# Patient Record
Sex: FEMALE | Race: WHITE | NOT HISPANIC OR LATINO | Employment: FULL TIME | URBAN - METROPOLITAN AREA
[De-identification: names, ages, dates, MRNs, and addresses within clinical notes are randomized per-mention and may not be internally consistent; named-entity substitution may affect disease eponyms.]

---

## 2017-01-17 DIAGNOSIS — F43.23 ADJUSTMENT DISORDER WITH MIXED ANXIETY AND DEPRESSED MOOD: ICD-10-CM

## 2017-01-19 RX ORDER — SERTRALINE HYDROCHLORIDE 50 MG/1
TABLET, FILM COATED ORAL
Qty: 30 TABLET | OUTPATIENT
Start: 2017-01-19

## 2017-01-19 RX ORDER — SERTRALINE HYDROCHLORIDE 50 MG/1
50 TABLET, FILM COATED ORAL DAILY
Qty: 30 TABLET | Refills: 1 | Status: SHIPPED | OUTPATIENT
Start: 2017-01-19 | End: 2017-02-27 | Stop reason: SDUPTHER

## 2017-01-23 ENCOUNTER — TELEPHONE (OUTPATIENT)
Dept: RHEUMATOLOGY | Facility: CLINIC | Age: 52
End: 2017-01-23

## 2017-01-23 RX ORDER — METHYLPREDNISOLONE 4 MG/1
TABLET ORAL
Qty: 21 TABLET | Refills: 0 | Status: SHIPPED | OUTPATIENT
Start: 2017-01-23 | End: 2017-05-29 | Stop reason: ALTCHOICE

## 2017-01-23 RX ORDER — SULINDAC 200 MG/1
200 TABLET ORAL 2 TIMES DAILY
Qty: 60 TABLET | Refills: 4 | Status: SHIPPED | OUTPATIENT
Start: 2017-01-23 | End: 2017-05-24

## 2017-01-23 NOTE — TELEPHONE ENCOUNTER
Has had no response to the increased dose of etodolac.  She felt especially bad over the weekend after overdoing it.  I will give her a Medrol Dosepak.  I'm changing her anti-inflammatory medicine to sulindac 200 mg twice daily.

## 2017-01-23 NOTE — TELEPHONE ENCOUNTER
----- Message from Emi Hill MA sent at 1/23/2017  3:40 PM CST -----  Contact: Self  Pt says  she's feeling a lot of pain and would like to speak with you regarding this  ----- Message -----     From: Jacquie Espana     Sent: 1/23/2017   2:49 PM       To: Jasiel Scott    Good afternoon,     Pt is requesting an appt due to changing medications. (first available 03/09/17). Pt stated she needed to be seen sooner.    Pt can be reached at [phone].    Thank you!

## 2017-02-16 ENCOUNTER — LAB VISIT (OUTPATIENT)
Dept: LAB | Facility: HOSPITAL | Age: 52
End: 2017-02-16
Attending: INTERNAL MEDICINE
Payer: COMMERCIAL

## 2017-02-16 ENCOUNTER — OFFICE VISIT (OUTPATIENT)
Dept: RHEUMATOLOGY | Facility: CLINIC | Age: 52
End: 2017-02-16
Payer: COMMERCIAL

## 2017-02-16 DIAGNOSIS — M79.10 MYALGIA: ICD-10-CM

## 2017-02-16 DIAGNOSIS — M79.10 MYALGIA: Primary | ICD-10-CM

## 2017-02-16 LAB
CK SERPL-CCNC: 68 U/L
CRP SERPL-MCNC: 0.8 MG/L
ERYTHROCYTE [SEDIMENTATION RATE] IN BLOOD BY WESTERGREN METHOD: 5 MM/HR
T4 FREE SERPL-MCNC: 0.78 NG/DL
TSH SERPL DL<=0.005 MIU/L-ACNC: 8.94 UIU/ML

## 2017-02-16 PROCEDURE — 84439 ASSAY OF FREE THYROXINE: CPT

## 2017-02-16 PROCEDURE — 99999 PR PBB SHADOW E&M-EST. PATIENT-LVL II: CPT | Mod: PBBFAC,,, | Performed by: INTERNAL MEDICINE

## 2017-02-16 PROCEDURE — 84443 ASSAY THYROID STIM HORMONE: CPT

## 2017-02-16 PROCEDURE — 36415 COLL VENOUS BLD VENIPUNCTURE: CPT

## 2017-02-16 PROCEDURE — 86140 C-REACTIVE PROTEIN: CPT

## 2017-02-16 PROCEDURE — 82550 ASSAY OF CK (CPK): CPT

## 2017-02-16 PROCEDURE — 99213 OFFICE O/P EST LOW 20 MIN: CPT | Mod: S$GLB,,, | Performed by: INTERNAL MEDICINE

## 2017-02-16 PROCEDURE — 85651 RBC SED RATE NONAUTOMATED: CPT

## 2017-02-16 PROCEDURE — 82085 ASSAY OF ALDOLASE: CPT

## 2017-02-16 RX ORDER — GABAPENTIN 100 MG/1
100 CAPSULE ORAL 3 TIMES DAILY
Qty: 90 CAPSULE | Refills: 11 | Status: SHIPPED | OUTPATIENT
Start: 2017-02-16 | End: 2017-12-04 | Stop reason: SDUPTHER

## 2017-02-17 ENCOUNTER — PATIENT MESSAGE (OUTPATIENT)
Dept: FAMILY MEDICINE | Facility: CLINIC | Age: 52
End: 2017-02-17

## 2017-02-17 DIAGNOSIS — E06.3 HYPOTHYROIDISM DUE TO HASHIMOTO'S THYROIDITIS: Primary | ICD-10-CM

## 2017-02-17 DIAGNOSIS — E03.8 HYPOTHYROIDISM DUE TO HASHIMOTO'S THYROIDITIS: Primary | ICD-10-CM

## 2017-02-17 LAB — ALDOLASE SERPL-CCNC: 5.1 U/L

## 2017-02-20 NOTE — PROGRESS NOTES
History of present illness: 51-year-old female with chronic pain due to fibromyalgia and osteoarthritis.  She did well initially after the last visit.  She called me 3 weeks ago because of increased aching.  I treated her with a Medrol Dosepak.  She had some response but the pain recurred upon stopping.  I changed her anti-inflammatory to sulindac but this did not help.  The pain is bad in the morning.  It does not increase activity.  It does interfere with her activity.  It does not wake her up at night.  She has had no swelling.  She describes it as a diffuse aching.  She has had no burning.  She has had no fever.  She has had no other recent medical problems.    Physical examination:  Musculoskeletal: She has full range of motion of all joints.  She has no soft tissue swelling, erythema, or increased warmth.  She is tender across the shoulder girdle.    Assessment: Fibromyalgia    Plans:  1.  Laboratory studies are obtained  2.  I added gabapentin 100 mg 3 times daily.  3.  Discontinue sulindac and I'm not placing her on a different anti-inflammatory agent  4.  Return to see me in 4 months

## 2017-02-21 RX ORDER — LEVOTHYROXINE SODIUM 112 UG/1
112 TABLET ORAL
Qty: 90 TABLET | Refills: 0 | Status: SHIPPED | OUTPATIENT
Start: 2017-02-21 | End: 2017-05-06 | Stop reason: SDUPTHER

## 2017-02-27 DIAGNOSIS — F43.23 ADJUSTMENT DISORDER WITH MIXED ANXIETY AND DEPRESSED MOOD: ICD-10-CM

## 2017-03-02 RX ORDER — SERTRALINE HYDROCHLORIDE 50 MG/1
TABLET, FILM COATED ORAL
Qty: 30 TABLET | Refills: 1 | Status: SHIPPED | OUTPATIENT
Start: 2017-03-02 | End: 2017-05-06 | Stop reason: SDUPTHER

## 2017-03-15 DIAGNOSIS — I10 ESSENTIAL HYPERTENSION: ICD-10-CM

## 2017-03-15 RX ORDER — BENAZEPRIL HYDROCHLORIDE AND HYDROCHLOROTHIAZIDE 10; 12.5 MG/1; MG/1
TABLET ORAL
Qty: 90 TABLET | Refills: 0 | Status: SHIPPED | OUTPATIENT
Start: 2017-03-15 | End: 2017-05-29 | Stop reason: SDUPTHER

## 2017-03-21 ENCOUNTER — PATIENT MESSAGE (OUTPATIENT)
Dept: FAMILY MEDICINE | Facility: CLINIC | Age: 52
End: 2017-03-21

## 2017-04-20 ENCOUNTER — TELEPHONE (OUTPATIENT)
Dept: FAMILY MEDICINE | Facility: CLINIC | Age: 52
End: 2017-04-20

## 2017-04-20 DIAGNOSIS — Z00.00 ROUTINE GENERAL MEDICAL EXAMINATION AT A HEALTH CARE FACILITY: Primary | ICD-10-CM

## 2017-05-06 DIAGNOSIS — E06.3 HYPOTHYROIDISM DUE TO HASHIMOTO'S THYROIDITIS: ICD-10-CM

## 2017-05-06 DIAGNOSIS — E03.8 HYPOTHYROIDISM DUE TO HASHIMOTO'S THYROIDITIS: ICD-10-CM

## 2017-05-06 DIAGNOSIS — F43.23 ADJUSTMENT DISORDER WITH MIXED ANXIETY AND DEPRESSED MOOD: ICD-10-CM

## 2017-05-07 RX ORDER — SERTRALINE HYDROCHLORIDE 50 MG/1
TABLET, FILM COATED ORAL
Qty: 30 TABLET | Refills: 0 | Status: SHIPPED | OUTPATIENT
Start: 2017-05-07 | End: 2017-05-29 | Stop reason: SDUPTHER

## 2017-05-07 RX ORDER — LEVOTHYROXINE SODIUM 112 UG/1
TABLET ORAL
Qty: 90 TABLET | Refills: 0 | Status: SHIPPED | OUTPATIENT
Start: 2017-05-07 | End: 2017-05-29 | Stop reason: SDUPTHER

## 2017-05-20 ENCOUNTER — LAB VISIT (OUTPATIENT)
Dept: LAB | Facility: HOSPITAL | Age: 52
End: 2017-05-20
Attending: INTERNAL MEDICINE
Payer: COMMERCIAL

## 2017-05-20 DIAGNOSIS — E06.3 HYPOTHYROIDISM DUE TO HASHIMOTO'S THYROIDITIS: ICD-10-CM

## 2017-05-20 DIAGNOSIS — E03.8 HYPOTHYROIDISM DUE TO HASHIMOTO'S THYROIDITIS: ICD-10-CM

## 2017-05-20 DIAGNOSIS — Z00.00 ROUTINE GENERAL MEDICAL EXAMINATION AT A HEALTH CARE FACILITY: ICD-10-CM

## 2017-05-20 LAB
ALBUMIN SERPL BCP-MCNC: 3.8 G/DL
ALP SERPL-CCNC: 92 U/L
ALT SERPL W/O P-5'-P-CCNC: 32 U/L
ANION GAP SERPL CALC-SCNC: 9 MMOL/L
AST SERPL-CCNC: 34 U/L
BASOPHILS # BLD AUTO: 0.02 K/UL
BASOPHILS NFR BLD: 0.4 %
BILIRUB SERPL-MCNC: 0.4 MG/DL
BUN SERPL-MCNC: 13 MG/DL
CALCIUM SERPL-MCNC: 9.3 MG/DL
CHLORIDE SERPL-SCNC: 103 MMOL/L
CHOLEST/HDLC SERPL: 4.9 {RATIO}
CO2 SERPL-SCNC: 27 MMOL/L
CREAT SERPL-MCNC: 0.8 MG/DL
DIFFERENTIAL METHOD: ABNORMAL
EOSINOPHIL # BLD AUTO: 0.2 K/UL
EOSINOPHIL NFR BLD: 3.4 %
ERYTHROCYTE [DISTWIDTH] IN BLOOD BY AUTOMATED COUNT: 13 %
EST. GFR  (AFRICAN AMERICAN): >60 ML/MIN/1.73 M^2
EST. GFR  (NON AFRICAN AMERICAN): >60 ML/MIN/1.73 M^2
GLUCOSE SERPL-MCNC: 97 MG/DL
HCT VFR BLD AUTO: 42.2 %
HDL/CHOLESTEROL RATIO: 20.5 %
HDLC SERPL-MCNC: 259 MG/DL
HDLC SERPL-MCNC: 53 MG/DL
HGB BLD-MCNC: 13.5 G/DL
LDLC SERPL CALC-MCNC: 183.6 MG/DL
LYMPHOCYTES # BLD AUTO: 1.3 K/UL
LYMPHOCYTES NFR BLD: 25 %
MCH RBC QN AUTO: 29 PG
MCHC RBC AUTO-ENTMCNC: 32 %
MCV RBC AUTO: 91 FL
MONOCYTES # BLD AUTO: 0.7 K/UL
MONOCYTES NFR BLD: 12.9 %
NEUTROPHILS # BLD AUTO: 2.9 K/UL
NEUTROPHILS NFR BLD: 58.1 %
NONHDLC SERPL-MCNC: 206 MG/DL
PLATELET # BLD AUTO: 140 K/UL
PMV BLD AUTO: 11 FL
POTASSIUM SERPL-SCNC: 3.8 MMOL/L
PROT SERPL-MCNC: 7.1 G/DL
RBC # BLD AUTO: 4.66 M/UL
SODIUM SERPL-SCNC: 139 MMOL/L
TRIGL SERPL-MCNC: 112 MG/DL
TSH SERPL DL<=0.005 MIU/L-ACNC: 0.51 UIU/ML
WBC # BLD AUTO: 5.03 K/UL

## 2017-05-20 PROCEDURE — 84443 ASSAY THYROID STIM HORMONE: CPT

## 2017-05-20 PROCEDURE — 80061 LIPID PANEL: CPT

## 2017-05-20 PROCEDURE — 80053 COMPREHEN METABOLIC PANEL: CPT

## 2017-05-20 PROCEDURE — 85025 COMPLETE CBC W/AUTO DIFF WBC: CPT

## 2017-05-20 PROCEDURE — 36415 COLL VENOUS BLD VENIPUNCTURE: CPT | Mod: PO

## 2017-05-24 ENCOUNTER — OFFICE VISIT (OUTPATIENT)
Dept: RHEUMATOLOGY | Facility: CLINIC | Age: 52
End: 2017-05-24
Payer: COMMERCIAL

## 2017-05-24 VITALS
WEIGHT: 195 LBS | HEIGHT: 64 IN | HEART RATE: 71 BPM | BODY MASS INDEX: 33.29 KG/M2 | SYSTOLIC BLOOD PRESSURE: 134 MMHG | DIASTOLIC BLOOD PRESSURE: 81 MMHG

## 2017-05-24 DIAGNOSIS — M76.51 PATELLAR TENDINITIS OF RIGHT KNEE: ICD-10-CM

## 2017-05-24 DIAGNOSIS — M79.7 FIBROMYALGIA: Primary | ICD-10-CM

## 2017-05-24 PROCEDURE — 99999 PR PBB SHADOW E&M-EST. PATIENT-LVL III: CPT | Mod: PBBFAC,,, | Performed by: INTERNAL MEDICINE

## 2017-05-24 PROCEDURE — 3079F DIAST BP 80-89 MM HG: CPT | Mod: S$GLB,,, | Performed by: INTERNAL MEDICINE

## 2017-05-24 PROCEDURE — 99213 OFFICE O/P EST LOW 20 MIN: CPT | Mod: S$GLB,,, | Performed by: INTERNAL MEDICINE

## 2017-05-24 PROCEDURE — 1160F RVW MEDS BY RX/DR IN RCRD: CPT | Mod: S$GLB,,, | Performed by: INTERNAL MEDICINE

## 2017-05-24 PROCEDURE — 3075F SYST BP GE 130 - 139MM HG: CPT | Mod: S$GLB,,, | Performed by: INTERNAL MEDICINE

## 2017-05-24 NOTE — PROGRESS NOTES
History of present illness: 52-year-old female with chronic pain due to osteoarthritis and fibromyalgia.  She was last seen in February.  Her pain had increased.  I took her off anti-inflammatory medicines because they were not helping.  I placed her on gabapentin 100 mg 3 times daily.  She feels this is helping.  Her thyroid level was low.  Her thyroid dosage was increased.  She is feeling better since then.  Her worse problems are her knees.  They're worse with prolonged activity.  She has also been kneeling paining a dresser in this made the pain worse.  She denies any swelling of the knee.  She used to topical medication which helped.  She has not been using heat or ice.  She is taking no other medication for the pain.    Physical examination:  Musculoskeletal: Shoulders, elbows, wrists, small joints of the hands are unremarkable.  She has bony hypertrophy of both knees.  She is tender especially over the infrapatellar tendon of the right knee.  She has no effusion.  She has pain on range of motion of the knee.    Assessment: Her main pain generator seems to be a infrapatellar tendinitis    Plans:  1.  Continue gabapentin as before  2.  Use heat to the knee  3.  Continue the topical medication  4.  She may use Tylenol as needed for pain  5.  Return to see me in 6 months

## 2017-05-29 ENCOUNTER — OFFICE VISIT (OUTPATIENT)
Dept: FAMILY MEDICINE | Facility: CLINIC | Age: 52
End: 2017-05-29
Payer: COMMERCIAL

## 2017-05-29 VITALS
TEMPERATURE: 98 F | HEIGHT: 64 IN | SYSTOLIC BLOOD PRESSURE: 130 MMHG | DIASTOLIC BLOOD PRESSURE: 88 MMHG | BODY MASS INDEX: 32.6 KG/M2 | WEIGHT: 190.94 LBS | HEART RATE: 76 BPM

## 2017-05-29 DIAGNOSIS — E66.9 OBESITY (BMI 30-39.9): ICD-10-CM

## 2017-05-29 DIAGNOSIS — G47.9 DIFFICULTY SLEEPING: ICD-10-CM

## 2017-05-29 DIAGNOSIS — M79.7 FIBROMYALGIA: ICD-10-CM

## 2017-05-29 DIAGNOSIS — E03.8 HYPOTHYROIDISM DUE TO HASHIMOTO'S THYROIDITIS: ICD-10-CM

## 2017-05-29 DIAGNOSIS — E78.5 HYPERLIPIDEMIA, UNSPECIFIED HYPERLIPIDEMIA TYPE: ICD-10-CM

## 2017-05-29 DIAGNOSIS — Z86.39 HISTORY OF LOW POTASSIUM: ICD-10-CM

## 2017-05-29 DIAGNOSIS — I10 ESSENTIAL HYPERTENSION: ICD-10-CM

## 2017-05-29 DIAGNOSIS — F43.23 ADJUSTMENT DISORDER WITH MIXED ANXIETY AND DEPRESSED MOOD: ICD-10-CM

## 2017-05-29 DIAGNOSIS — Z23 NEED FOR TETANUS BOOSTER: ICD-10-CM

## 2017-05-29 DIAGNOSIS — E06.3 HYPOTHYROIDISM DUE TO HASHIMOTO'S THYROIDITIS: ICD-10-CM

## 2017-05-29 DIAGNOSIS — Z86.39 HISTORY OF VITAMIN D DEFICIENCY: ICD-10-CM

## 2017-05-29 DIAGNOSIS — Z00.00 ANNUAL PHYSICAL EXAM: Primary | ICD-10-CM

## 2017-05-29 PROCEDURE — 99999 PR PBB SHADOW E&M-EST. PATIENT-LVL III: CPT | Mod: PBBFAC,,, | Performed by: INTERNAL MEDICINE

## 2017-05-29 PROCEDURE — 90471 IMMUNIZATION ADMIN: CPT | Mod: S$GLB,,, | Performed by: INTERNAL MEDICINE

## 2017-05-29 PROCEDURE — 99396 PREV VISIT EST AGE 40-64: CPT | Mod: 25,S$GLB,, | Performed by: INTERNAL MEDICINE

## 2017-05-29 PROCEDURE — 90714 TD VACC NO PRESV 7 YRS+ IM: CPT | Mod: S$GLB,,, | Performed by: INTERNAL MEDICINE

## 2017-05-29 RX ORDER — BENAZEPRIL HYDROCHLORIDE AND HYDROCHLOROTHIAZIDE 10; 12.5 MG/1; MG/1
1 TABLET ORAL DAILY
Qty: 90 TABLET | Refills: 3 | Status: SHIPPED | OUTPATIENT
Start: 2017-05-29 | End: 2018-06-11 | Stop reason: SDUPTHER

## 2017-05-29 RX ORDER — SERTRALINE HYDROCHLORIDE 50 MG/1
50 TABLET, FILM COATED ORAL DAILY
Qty: 90 TABLET | Refills: 3 | Status: SHIPPED | OUTPATIENT
Start: 2017-05-29 | End: 2018-06-11 | Stop reason: SDUPTHER

## 2017-05-29 RX ORDER — LEVOTHYROXINE SODIUM 112 UG/1
112 TABLET ORAL
Qty: 90 TABLET | Refills: 3 | Status: SHIPPED | OUTPATIENT
Start: 2017-05-29 | End: 2018-05-08 | Stop reason: SDUPTHER

## 2017-05-29 RX ORDER — ERGOCALCIFEROL 1.25 MG/1
50000 CAPSULE ORAL
Qty: 12 CAPSULE | Refills: 3 | Status: SHIPPED | OUTPATIENT
Start: 2017-05-29 | End: 2018-05-08 | Stop reason: SDUPTHER

## 2017-05-29 NOTE — PROGRESS NOTES
Two patient identifiers verified.  Allergies reviewed.   TD IM administered to left deltoid per MD order.  Patient tolerated injection well; no redness, bleeding, or bruising noted to injection site.  Patient instructed to remain in clinic setting for 15 minutes.  Verbalizes understanding.

## 2017-05-29 NOTE — PROGRESS NOTES
Subjective:        Patient ID: Winifred Love is a 52 y.o. female.    Chief Complaint: Annual Exam    HPI   Winifred Love presents for annual exam.    Review of Systems   Constitutional: Positive for activity change (more regular physical activity). Negative for unexpected weight change.   HENT: Negative for hearing loss.    Eyes: Negative for visual disturbance (glasses UTD).   Respiratory: Negative for chest tightness and shortness of breath.    Cardiovascular: Negative for chest pain and leg swelling.   Gastrointestinal: Negative for abdominal pain, blood in stool, constipation and diarrhea.   Genitourinary: Negative for difficulty urinating, hematuria, vaginal bleeding and vaginal discharge.   Skin: Negative for rash.   Neurological: Negative for dizziness and light-headedness.   Psychiatric/Behavioral: Positive for sleep disturbance (chronic).        - depressed mood, anxiety better           Objective:        Vitals:    05/29/17 0905   BP: 130/88   Pulse: 76   Temp: 98.4 °F (36.9 °C)     Physical Exam        Assessment:         1. Annual physical exam    2. Need for tetanus booster    3. Essential hypertension    4. Hypothyroidism due to Hashimoto's thyroiditis    5. History of low potassium    6. Fibromyalgia    7. Obesity (BMI 30-39.9)    8. History of vitamin D deficiency    9. Adjustment disorder with mixed anxiety and depressed mood    10. Difficulty sleeping    11. Hyperlipidemia, unspecified hyperlipidemia type              Plan:         Winifred was seen today for annual exam.    Diagnoses and all orders for this visit:    Annual physical exam  - Reviewed guidelines for breast, colorectal and cervical CA screenings.  Pt declines all screenings.  Concerned about unwanted treatments if something was found, never wants to have chemotherapy.  - Td today    Need for tetanus booster  -     Td Vaccine (Adult) - Preservative Free    Essential hypertension: Controlled; continue Benazepril/HCTZ  10/12.5mg qd.  -     benazepril-hydrochlorthiazide (LOTENSIN HCT) 10-12.5 mg Tab; Take 1 tablet by mouth once daily.    Hypothyroidism due to Hashimoto's thyroiditis: Well controlled w Levothyroxine 112 qd  -     levothyroxine (SYNTHROID) 112 MCG tablet; Take 1 tablet (112 mcg total) by mouth before breakfast.    History of low potassium: K WNL; stop daily K supplement.  Pt had self discontinued K supplement 2 months ago.    Fibromyalgia: No acute issues; better w Gabapentin.  Encouraged pt to continue with daily physical activity as tolerated.  Follow up rheum.    Obesity (BMI 30-39.9): Stable; discussed diet in regard to HLD and exercise.    History of vitamin D deficiency: No acute issues; Vit D qweek.  -     ergocalciferol (ERGOCALCIFEROL) 50,000 unit Cap; Take 1 capsule (50,000 Units total) by mouth every 7 days.    Adjustment disorder with mixed anxiety and depressed mood: Stable on Sertraline 50mg qd.  -     sertraline (ZOLOFT) 50 MG tablet; Take 1 tablet (50 mg total) by mouth once daily.    Difficulty sleeping: Unisom PRN    Hyperlipidemia, unspecified hyperlipidemia type: ASCVD risk 3.5%; no statin indicated.  Discussed diet.          Return in 1  year for annual exam or sooner PRN.    Patient Instructions     Low-Cholesterol Diet  Your body needs cholesterol to build new cells and create certain hormones. There are 2 kinds of cholesterol in your blood:    · HDL (good) cholesterol. This prevents fat deposits (plaque) from building up in your arteries. In this way it protects against heart disease and stroke.  · LDL (bad) cholesterol. This stays in your body and sticks to artery walls. Over time it may block blood flow to the heart and brain. This can cause a heart attack or stroke.  The cholesterol in your blood comes from 2 sources: cholesterol in food that you eat and cholesterol that your liver makes. You should limit the amount of cholesterol in your diet. But the cholesterol that your body makes  has the greatest disease risk. And your body makes more cholesterol when your diet is high in bad fats (saturated and trans fats). There are 2 kinds of fats you can eat:  · Good fats, or unsaturated fats (mono-unsaturated and poly-unsaturated). They raise the level of good cholesterol and lower the level of bad cholesterol. Good fats are found in vegetable oils such as olive, sunflower, corn, and soybean oils, and in nuts and seeds.  · Bad fats, or saturated fats (including foods high in cholesterol) and trans fats. These raise your risk of disease. They lower the good cholesterol and raise the level of bad cholesterol. Bad fats are found in animal products, including meat, whole-milk dairy products, and butter. Some plants are also high in bad fats (coconut and palm plants). Trans fats are found in hard (stick) margarines. They are also in many fast foods and commercially baked goods. Soft margarine sold in tubs has fewer trans fats and is safer to use.  High blood cholesterol is usually due to a diet high in saturated fat, along with not being physically active. In some cases, genetics plays a role in causing high cholesterol. The tips below will help you create healthy eating habits that will help lower your blood cholesterol level.  Create a diet high in good fats, low in bad fats (and low in cholesterol)  The following steps will help you create a diet high in good fats and low in bad fats:  · Talk with your doctor before starting a low cholesterol diet or weight loss program.  · Learn to read nutrition labels and select appropriate portion sizes.  · When cooking, use plant-based unsaturated vegetable oils (sunflower, corn, soybean, canola, peanut, and olive oils).  · Avoid saturated fats found in animal products such as meat, dairy (whole-milk, cheese and ice cream), poultry skin, and egg yolks. Plants high in saturated oils include coconut oil, palm oil, and palm kernel oil.  · If you eat meat, choose smaller  portions and lean cuts, such as round, mercy, sirloin, or loin. Eat more meatless meals.  · Replace meat with fish at least 2 times a week. Fish is an important source of the unsaturated fat called omega-3 fatty acids. This fat has potential to lower the risk of heart disease.  · Replace whole-milk dairy products with low-fat or nonfat products. Try soy products. Soy helps to reduce total cholesterol.  · Supplement your diet with protective fibers. Eat nuts, seeds, and whole grains rather than white rice and bread. These foods lower both cholesterol and triglyceride levels. (Triglycerides are another fat found in the blood.) Walnuts are one of the best sources of omega-3 fatty acids.  · Eat plenty of fresh fruits and vegetables daily.  · Avoid fast foods and commercial baked goods. Assume they contain saturated fat unless labeled otherwise.  Date Last Reviewed: 8/1/2016  © 9452-0018 [employer] [address] [place] [address], [place] [address]. All rights reserved. This information is not intended as a substitute for professional medical care. Always follow your healthcare professional's instructions.

## 2017-05-29 NOTE — PATIENT INSTRUCTIONS
Low-Cholesterol Diet  Your body needs cholesterol to build new cells and create certain hormones. There are 2 kinds of cholesterol in your blood:    · HDL (good) cholesterol. This prevents fat deposits (plaque) from building up in your arteries. In this way it protects against heart disease and stroke.  · LDL (bad) cholesterol. This stays in your body and sticks to artery walls. Over time it may block blood flow to the heart and brain. This can cause a heart attack or stroke.  The cholesterol in your blood comes from 2 sources: cholesterol in food that you eat and cholesterol that your liver makes. You should limit the amount of cholesterol in your diet. But the cholesterol that your body makes has the greatest disease risk. And your body makes more cholesterol when your diet is high in bad fats (saturated and trans fats). There are 2 kinds of fats you can eat:  · Good fats, or unsaturated fats (mono-unsaturated and poly-unsaturated). They raise the level of good cholesterol and lower the level of bad cholesterol. Good fats are found in vegetable oils such as olive, sunflower, corn, and soybean oils, and in nuts and seeds.  · Bad fats, or saturated fats (including foods high in cholesterol) and trans fats. These raise your risk of disease. They lower the good cholesterol and raise the level of bad cholesterol. Bad fats are found in animal products, including meat, whole-milk dairy products, and butter. Some plants are also high in bad fats (coconut and palm plants). Trans fats are found in hard (stick) margarines. They are also in many fast foods and commercially baked goods. Soft margarine sold in tubs has fewer trans fats and is safer to use.  High blood cholesterol is usually due to a diet high in saturated fat, along with not being physically active. In some cases, genetics plays a role in causing high cholesterol. The tips below will help you create healthy eating habits that will help lower your blood  cholesterol level.  Create a diet high in good fats, low in bad fats (and low in cholesterol)  The following steps will help you create a diet high in good fats and low in bad fats:  · Talk with your doctor before starting a low cholesterol diet or weight loss program.  · Learn to read nutrition labels and select appropriate portion sizes.  · When cooking, use plant-based unsaturated vegetable oils (sunflower, corn, soybean, canola, peanut, and olive oils).  · Avoid saturated fats found in animal products such as meat, dairy (whole-milk, cheese and ice cream), poultry skin, and egg yolks. Plants high in saturated oils include coconut oil, palm oil, and palm kernel oil.  · If you eat meat, choose smaller portions and lean cuts, such as round, mercy, sirloin, or loin. Eat more meatless meals.  · Replace meat with fish at least 2 times a week. Fish is an important source of the unsaturated fat called omega-3 fatty acids. This fat has potential to lower the risk of heart disease.  · Replace whole-milk dairy products with low-fat or nonfat products. Try soy products. Soy helps to reduce total cholesterol.  · Supplement your diet with protective fibers. Eat nuts, seeds, and whole grains rather than white rice and bread. These foods lower both cholesterol and triglyceride levels. (Triglycerides are another fat found in the blood.) Walnuts are one of the best sources of omega-3 fatty acids.  · Eat plenty of fresh fruits and vegetables daily.  · Avoid fast foods and commercial baked goods. Assume they contain saturated fat unless labeled otherwise.  Date Last Reviewed: 8/1/2016  © 2523-9267 [employer]. [address] [place] [address], [place] [address]. All rights reserved. This information is not intended as a substitute for professional medical care. Always follow your healthcare professional's instructions.

## 2017-08-25 DIAGNOSIS — Z12.11 COLON CANCER SCREENING: ICD-10-CM

## 2017-09-08 DIAGNOSIS — Z12.11 COLON CANCER SCREENING: ICD-10-CM

## 2017-12-04 ENCOUNTER — OFFICE VISIT (OUTPATIENT)
Dept: RHEUMATOLOGY | Facility: CLINIC | Age: 52
End: 2017-12-04
Payer: COMMERCIAL

## 2017-12-04 VITALS
SYSTOLIC BLOOD PRESSURE: 148 MMHG | RESPIRATION RATE: 18 BRPM | HEART RATE: 71 BPM | WEIGHT: 196.81 LBS | DIASTOLIC BLOOD PRESSURE: 85 MMHG | BODY MASS INDEX: 33.78 KG/M2

## 2017-12-04 DIAGNOSIS — M79.7 FIBROMYALGIA: Primary | ICD-10-CM

## 2017-12-04 PROCEDURE — 99999 PR PBB SHADOW E&M-EST. PATIENT-LVL III: CPT | Mod: PBBFAC,,, | Performed by: INTERNAL MEDICINE

## 2017-12-04 PROCEDURE — 99213 OFFICE O/P EST LOW 20 MIN: CPT | Mod: S$GLB,,, | Performed by: INTERNAL MEDICINE

## 2017-12-04 RX ORDER — GABAPENTIN 300 MG/1
300 CAPSULE ORAL 3 TIMES DAILY
Qty: 90 CAPSULE | Refills: 11 | Status: SHIPPED | OUTPATIENT
Start: 2017-12-04 | End: 2018-08-02

## 2017-12-04 ASSESSMENT — ROUTINE ASSESSMENT OF PATIENT INDEX DATA (RAPID3)
PATIENT GLOBAL ASSESSMENT SCORE: 9
AM STIFFNESS SCORE: 1, YES
FATIGUE SCORE: 10
PAIN SCORE: 8
PSYCHOLOGICAL DISTRESS SCORE: 4.4
WHEN YOU AWAKENED IN THE MORNING OVER THE LAST WEEK, PLEASE INDICATE THE AMOUNT OF TIME IT TAKES UNTIL YOU ARE AS LIMBER AS YOU WILL BE FOR THE DAY: MOST OF THE DAY
MDHAQ FUNCTION SCORE: 1.2
TOTAL RAPID3 SCORE: 7

## 2017-12-04 NOTE — PROGRESS NOTES
History of present illness: 52-year-old female I saw initially 18 months ago.  She was referred at that time because of a positive FRANCISCO.  She had no evidence of underlying connective tissue disease.  She did have positive thyroid antibodies which was felt to be the cause of her positive FRANCISCO.  She has a diffuse pain syndrome compatible with fibromyalgia.  She has been feeling worse for the past several weeks.  She was especially bad when she was in [place].  The problem is primarily in her knees but also her hands and feet.  She has had trouble walking.  She has had increased fatigue.  She denies any joint swelling.    She has been taking gabapentin 100 mg 3 times daily.  It gives her minimal relief but she is tolerating.  She takes Tylenol for headache but it does not help her overall pain.  She has not been using heat or ice recently.  She has not been using topical medications.  She continues to work.  She has not had her thyroid checked recently.    Physical examination:  Musculoskeletal: She has good range of motion of all joints.  She has no evidence of synovitis.  She has scattered tender areas to palpation.    Assessment: She has evidence of osteoarthritis and fibromyalgia.  She has no evidence of an inflammatory arthritis.    Plans:  1.  Increase gabapentin to 300 mg 3 times daily.  She is to try it for a month.  If it is not helping I may increase the dose in the future  2.  I recommend using heat to her knees  3.  Return to see me in 6 months

## 2018-02-15 ENCOUNTER — PATIENT MESSAGE (OUTPATIENT)
Dept: ENDOCRINOLOGY | Facility: CLINIC | Age: 53
End: 2018-02-15

## 2018-02-15 ENCOUNTER — TELEPHONE (OUTPATIENT)
Dept: ENDOCRINOLOGY | Facility: CLINIC | Age: 53
End: 2018-02-15

## 2018-02-15 NOTE — TELEPHONE ENCOUNTER
----- Message from Layne Travis sent at 2/14/2018  4:35 PM CST -----     From: Winifred Love     Sent: 2/14/2018  3:13 PM CST       To: Patient Appointment Schedule Request Mailing List  Subject: Appointment Request    Appointment Request From: Winifred Love    With Provider: Other - (see comments)    Would Accept With:Only the person I've selected    Preferred Date Range: Any date 2/14/2018 or later    Preferred Times: Any    Reason for visit: Request an Appt    Comments:  I would like to request an appointment with Dr. Ralph Alcaraz please.      Thank you  Winifred Love    I have hypothyroidism and hashimotos.  I currently see an internal medicine Dr. and just do not feel like my other symptoms are being addressed in relation to my thyroid issues, and whatever else may be going on.  I have been on Levothyroxine now for the past 10 years and have gotten worse and worse.  My weight has ballooned, my hair is falling out and I am exhausted all of the time.  I am having trouble with my joints and major sleep issues, along with brain fog.  I am dealing with all of this while still working full time.  My job depends on my alertness and mental capacity to be unimpaired.  I feel as if I am at a crossroads in my life and I want to seek a specialists opinion.  I would like  all things concerned with my thyroid and metabolism looked at and tested.  I want my life back to some degree.  I used to be a very active person and this has and continues to wipe me out both physically and mentally.  I am asking for help.  I need help, and someone who will listen and help me  if at all possible.  And if there is something available sooner, I'd appreciate it.    Thank you  Winifred Love    Pt can be reached at [phone].    Pt is requesting a sooner appt than 07/2018.

## 2018-05-07 ENCOUNTER — LAB VISIT (OUTPATIENT)
Dept: LAB | Facility: HOSPITAL | Age: 53
End: 2018-05-07
Attending: INTERNAL MEDICINE
Payer: COMMERCIAL

## 2018-05-07 ENCOUNTER — OFFICE VISIT (OUTPATIENT)
Dept: ENDOCRINOLOGY | Facility: CLINIC | Age: 53
End: 2018-05-07
Payer: COMMERCIAL

## 2018-05-07 VITALS — WEIGHT: 196.63 LBS | HEIGHT: 64 IN | RESPIRATION RATE: 20 BRPM | BODY MASS INDEX: 33.57 KG/M2 | TEMPERATURE: 98 F

## 2018-05-07 DIAGNOSIS — E06.3 HYPOTHYROIDISM DUE TO HASHIMOTO'S THYROIDITIS: Primary | ICD-10-CM

## 2018-05-07 DIAGNOSIS — E66.9 OBESITY (BMI 30-39.9): ICD-10-CM

## 2018-05-07 DIAGNOSIS — E78.00 HYPERCHOLESTEROLEMIA: ICD-10-CM

## 2018-05-07 DIAGNOSIS — E53.8 B12 DEFICIENCY: ICD-10-CM

## 2018-05-07 DIAGNOSIS — E03.8 HYPOTHYROIDISM DUE TO HASHIMOTO'S THYROIDITIS: ICD-10-CM

## 2018-05-07 DIAGNOSIS — E06.9 THYROIDITIS: ICD-10-CM

## 2018-05-07 DIAGNOSIS — E88.810 DYSMETABOLIC SYNDROME: ICD-10-CM

## 2018-05-07 DIAGNOSIS — E55.9 HYPOVITAMINOSIS D: ICD-10-CM

## 2018-05-07 DIAGNOSIS — E06.3 HASHIMOTO'S DISEASE: ICD-10-CM

## 2018-05-07 DIAGNOSIS — E04.9 GOITER: ICD-10-CM

## 2018-05-07 DIAGNOSIS — F43.23 ADJUSTMENT DISORDER WITH MIXED ANXIETY AND DEPRESSED MOOD: ICD-10-CM

## 2018-05-07 DIAGNOSIS — I10 ESSENTIAL HYPERTENSION: ICD-10-CM

## 2018-05-07 DIAGNOSIS — L85.3 XEROSIS OF SKIN: ICD-10-CM

## 2018-05-07 DIAGNOSIS — R53.82 CHRONIC FATIGUE: ICD-10-CM

## 2018-05-07 DIAGNOSIS — E78.5 HYPERLIPIDEMIA, UNSPECIFIED HYPERLIPIDEMIA TYPE: ICD-10-CM

## 2018-05-07 DIAGNOSIS — E06.3 HYPOTHYROIDISM DUE TO HASHIMOTO'S THYROIDITIS: ICD-10-CM

## 2018-05-07 DIAGNOSIS — R79.89 ABNORMAL THYROID BLOOD TEST: ICD-10-CM

## 2018-05-07 DIAGNOSIS — M79.7 FIBROMYALGIA: ICD-10-CM

## 2018-05-07 DIAGNOSIS — Z78.0 POSTMENOPAUSAL: ICD-10-CM

## 2018-05-07 DIAGNOSIS — E03.8 HYPOTHYROIDISM DUE TO HASHIMOTO'S THYROIDITIS: Primary | ICD-10-CM

## 2018-05-07 LAB
25(OH)D3+25(OH)D2 SERPL-MCNC: 18 NG/ML
ALBUMIN SERPL BCP-MCNC: 4.1 G/DL
ALP SERPL-CCNC: 99 U/L
ALT SERPL W/O P-5'-P-CCNC: 14 U/L
ANION GAP SERPL CALC-SCNC: 8 MMOL/L
AST SERPL-CCNC: 20 U/L
BASOPHILS # BLD AUTO: 0.04 K/UL
BASOPHILS NFR BLD: 0.7 %
BILIRUB SERPL-MCNC: 0.5 MG/DL
BUN SERPL-MCNC: 13 MG/DL
CA-I BLDV-SCNC: 1.19 MMOL/L
CALCIUM SERPL-MCNC: 9.4 MG/DL
CHLORIDE SERPL-SCNC: 103 MMOL/L
CHOLEST SERPL-MCNC: 288 MG/DL
CHOLEST/HDLC SERPL: 4.9 {RATIO}
CO2 SERPL-SCNC: 29 MMOL/L
CREAT SERPL-MCNC: 0.8 MG/DL
DHEA-S SERPL-MCNC: 68.7 UG/DL
DIFFERENTIAL METHOD: ABNORMAL
EOSINOPHIL # BLD AUTO: 0 K/UL
EOSINOPHIL NFR BLD: 0.2 %
ERYTHROCYTE [DISTWIDTH] IN BLOOD BY AUTOMATED COUNT: 13.2 %
EST. GFR  (AFRICAN AMERICAN): >60 ML/MIN/1.73 M^2
EST. GFR  (NON AFRICAN AMERICAN): >60 ML/MIN/1.73 M^2
FOLATE SERPL-MCNC: 6.7 NG/ML
GLUCOSE SERPL-MCNC: 86 MG/DL
HCT VFR BLD AUTO: 41.7 %
HDLC SERPL-MCNC: 59 MG/DL
HDLC SERPL: 20.5 %
HGB BLD-MCNC: 13.2 G/DL
IMM GRANULOCYTES # BLD AUTO: 0.01 K/UL
IMM GRANULOCYTES NFR BLD AUTO: 0.2 %
LDLC SERPL CALC-MCNC: 195.6 MG/DL
LYMPHOCYTES # BLD AUTO: 1.5 K/UL
LYMPHOCYTES NFR BLD: 27.5 %
MCH RBC QN AUTO: 28.6 PG
MCHC RBC AUTO-ENTMCNC: 31.7 G/DL
MCV RBC AUTO: 90 FL
MONOCYTES # BLD AUTO: 0.6 K/UL
MONOCYTES NFR BLD: 9.9 %
NEUTROPHILS # BLD AUTO: 3.4 K/UL
NEUTROPHILS NFR BLD: 61.5 %
NONHDLC SERPL-MCNC: 229 MG/DL
NRBC BLD-RTO: 0 /100 WBC
PLATELET # BLD AUTO: 162 K/UL
PMV BLD AUTO: 11.1 FL
POTASSIUM SERPL-SCNC: 3.3 MMOL/L
PROT SERPL-MCNC: 7.5 G/DL
PTH-INTACT SERPL-MCNC: 60 PG/ML
RBC # BLD AUTO: 4.62 M/UL
SODIUM SERPL-SCNC: 140 MMOL/L
T3 SERPL-MCNC: 84 NG/DL
T4 FREE SERPL-MCNC: 0.96 NG/DL
TRIGL SERPL-MCNC: 167 MG/DL
TSH SERPL DL<=0.005 MIU/L-ACNC: 11.65 UIU/ML
URATE SERPL-MCNC: 5.1 MG/DL
VIT B12 SERPL-MCNC: 185 PG/ML
WBC # BLD AUTO: 5.56 K/UL

## 2018-05-07 PROCEDURE — 82747 ASSAY OF FOLIC ACID RBC: CPT

## 2018-05-07 PROCEDURE — 86340 INTRINSIC FACTOR ANTIBODY: CPT

## 2018-05-07 PROCEDURE — 83970 ASSAY OF PARATHORMONE: CPT

## 2018-05-07 PROCEDURE — 82746 ASSAY OF FOLIC ACID SERUM: CPT

## 2018-05-07 PROCEDURE — 80053 COMPREHEN METABOLIC PANEL: CPT

## 2018-05-07 PROCEDURE — 84439 ASSAY OF FREE THYROXINE: CPT

## 2018-05-07 PROCEDURE — 82024 ASSAY OF ACTH: CPT

## 2018-05-07 PROCEDURE — 82306 VITAMIN D 25 HYDROXY: CPT

## 2018-05-07 PROCEDURE — 84432 ASSAY OF THYROGLOBULIN: CPT

## 2018-05-07 PROCEDURE — 84550 ASSAY OF BLOOD/URIC ACID: CPT

## 2018-05-07 PROCEDURE — 82607 VITAMIN B-12: CPT

## 2018-05-07 PROCEDURE — 83036 HEMOGLOBIN GLYCOSYLATED A1C: CPT

## 2018-05-07 PROCEDURE — 99999 PR PBB SHADOW E&M-EST. PATIENT-LVL III: CPT | Mod: PBBFAC,,, | Performed by: INTERNAL MEDICINE

## 2018-05-07 PROCEDURE — 99204 OFFICE O/P NEW MOD 45 MIN: CPT | Mod: S$GLB,,, | Performed by: INTERNAL MEDICINE

## 2018-05-07 PROCEDURE — 86800 THYROGLOBULIN ANTIBODY: CPT | Mod: 91

## 2018-05-07 PROCEDURE — 84443 ASSAY THYROID STIM HORMONE: CPT

## 2018-05-07 PROCEDURE — 3008F BODY MASS INDEX DOCD: CPT | Mod: CPTII,S$GLB,, | Performed by: INTERNAL MEDICINE

## 2018-05-07 PROCEDURE — 86376 MICROSOMAL ANTIBODY EACH: CPT

## 2018-05-07 PROCEDURE — 85025 COMPLETE CBC W/AUTO DIFF WBC: CPT

## 2018-05-07 PROCEDURE — 82627 DEHYDROEPIANDROSTERONE: CPT

## 2018-05-07 PROCEDURE — 82533 TOTAL CORTISOL: CPT

## 2018-05-07 PROCEDURE — 82607 VITAMIN B-12: CPT | Mod: 91

## 2018-05-07 PROCEDURE — 82941 ASSAY OF GASTRIN: CPT

## 2018-05-07 PROCEDURE — 80061 LIPID PANEL: CPT

## 2018-05-07 PROCEDURE — 84480 ASSAY TRIIODOTHYRONINE (T3): CPT

## 2018-05-07 PROCEDURE — 36415 COLL VENOUS BLD VENIPUNCTURE: CPT | Mod: PO

## 2018-05-07 PROCEDURE — 86256 FLUORESCENT ANTIBODY TITER: CPT

## 2018-05-07 PROCEDURE — 82330 ASSAY OF CALCIUM: CPT

## 2018-05-07 PROCEDURE — 82626 DEHYDROEPIANDROSTERONE: CPT

## 2018-05-07 NOTE — PROGRESS NOTES
Subjective:      Patient ID: Winifred Love is a 53 y.o. female.    Chief Complaint:      53 yr old lady with hypothyroidism on thyroid hormone repletion seen for initial care visit today.    History of Present Illness    53 yr old lady with hypothyroidism presumably due to Hashimoto's disease  Seen for initial care visit today.  She is presently on LT4 112mcg Qd.  Her associated comorbidities are as detailed below;  Patient Active Problem List   Diagnosis    Hypothyroidism due to Hashimoto's thyroiditis    Essential hypertension    Obesity (BMI 30-39.9)    History of low potassium    B12 deficiency    History of vitamin D deficiency    Keratosis pilaris    Xerosis of skin    Polyarthralgia    Fibromyalgia    Adjustment disorder with mixed anxiety and depressed mood    Hyperlipidemia    Difficulty sleeping    Hashimoto's disease    Thyroiditis    Goiter    Abnormal thyroid blood test    Dysmetabolic syndrome    Hypercholesterolemia    Hypovitaminosis D    Postmenopausal     Patients baseline epworth score is 11. She has never had a prior sleep study.  Patient does have remote reports of snoring but none of apneic or gasping spells. She has no major problems with headaches. She has no major mid day somnolence.   She does have chronic fatigue and fibromyalgia. Patient also has concerns regarding progressive weight gain. She gained ~ 9 lbs over the last 2 years. Patients see Dr Weathers for fibromyalgia and non specific polyarthralgia and is on Gabapentin for this.  Patients father has MCTD and lupus.  Patients father has hyperthyroidism and her sister also has variable thyroid function problems.  Patient is menopausal in ~ 1 yr.  Patient has been known to be hypothyroid since 2006.   Patient stopped smoking in 2004.   Grav 5 Para 4+1 ( 4 alive)         Review of Systems   Constitutional: Positive for unexpected weight change (Progressive weight gain since she stopped smoking and went into  "menopause). Negative for activity change, appetite change, chills, diaphoresis and fatigue.   HENT: Negative for facial swelling, nosebleeds, sore throat, trouble swallowing and voice change.    Eyes: Negative for photophobia and visual disturbance.   Respiratory: Positive for cough (intermittent chronic cough). Negative for shortness of breath, wheezing and stridor.    Cardiovascular: Negative for chest pain, palpitations and leg swelling.   Gastrointestinal: Negative for abdominal distention, abdominal pain, constipation, diarrhea, nausea and vomiting.   Endocrine: Negative for cold intolerance, heat intolerance, polydipsia, polyphagia and polyuria.   Genitourinary: Negative for difficulty urinating, dysuria, frequency, menstrual problem (postmenopausal) and urgency.   Musculoskeletal: Positive for arthralgias (mainly of the knees). Negative for back pain, gait problem, joint swelling, myalgias, neck pain and neck stiffness.   Skin: Positive for rash (chronic papular rash mainly involving the arms bilaterally associated with mild itching). Negative for color change and pallor.   Neurological: Negative for dizziness, tremors, seizures, syncope, weakness, light-headedness, numbness and headaches.   Hematological: Does not bruise/bleed easily.   Psychiatric/Behavioral: Positive for sleep disturbance (non restorative sleep as detailed above). Negative for agitation, confusion, dysphoric mood and hallucinations. The patient is not nervous/anxious.        Objective:   Temp 98.4 °F (36.9 °C) (Oral)   Resp 20   Ht 5' 4" (1.626 m)   Wt 89.2 kg (196 lb 10.4 oz)   BMI 33.76 kg/m²  Body surface area is 2.01 meters squared. waist circ; 40.25 " hip circ; 47.75" neck circ; 14" waist to hip ratio; 0.84. /91 and RR 20/min with MT; 69/min           Physical Exam   Constitutional: She is oriented to person, place, and time. Vital signs are normal. She appears well-developed and well-nourished. She does not appear ill. No " distress.   Pleasant middle aged lady. Not pale, anicteric and afebrile. Well hydrated. Clinically comfortable.   HENT:   Head: Normocephalic and atraumatic. Not macrocephalic. Head is without right periorbital erythema and without left periorbital erythema. Hair is normal.   Nose: Nose normal.   Mouth/Throat: Oropharynx is clear and moist. Mucous membranes are not pale and not dry. No oropharyngeal exudate.   No nuchal AN but has some skin tags in the upper chest and lower neck region.   Eyes: Conjunctivae, EOM and lids are normal. Pupils are equal, round, and reactive to light. Right eye exhibits no discharge. Left eye exhibits no discharge. No scleral icterus.   Neck: Trachea normal, normal range of motion and full passive range of motion without pain. Neck supple. Normal carotid pulses and no JVD present. Carotid bruit is not present. No tracheal deviation present. No thyroid mass and no thyromegaly present.   Cardiovascular: Normal rate, regular rhythm, S1 normal, S2 normal, normal heart sounds, intact distal pulses and normal pulses.  PMI is not displaced.  Exam reveals no gallop.    No murmur heard.  Pulmonary/Chest: Effort normal and breath sounds normal. No stridor. No respiratory distress. She has no wheezes. She has no rales.   Abdominal: Soft. Bowel sounds are normal. She exhibits no distension and no mass. There is no hepatosplenomegaly, splenomegaly or hepatomegaly. There is no tenderness. There is no rebound, no guarding and no CVA tenderness. No hernia. Hernia confirmed negative in the ventral area.   Obese anterior abdominal wall.   Musculoskeletal: She exhibits no edema or tenderness.        Right shoulder: She exhibits normal range of motion, no bony tenderness, no crepitus, no deformity, no pain, no spasm, normal pulse and normal strength.   No pedal edema nor calf swelling.   Lymphadenopathy:     She has no cervical adenopathy.   Neurological: She is alert and oriented to person, place, and  time. She has normal strength and normal reflexes. She displays no atrophy, no tremor and normal reflexes. No cranial nerve deficit or sensory deficit. She exhibits normal muscle tone. Coordination and gait normal.   Skin: Skin is warm, dry and intact. No bruising, no ecchymosis, no petechiae and no rash noted. She is not diaphoretic. No cyanosis or erythema. No pallor. Nails show no clubbing.   Psychiatric: She has a normal mood and affect. Her speech is normal and behavior is normal. Judgment and thought content normal. Cognition and memory are normal.   Nursing note and vitals reviewed.      Lab Review:     Results for DEXTER OLSEN (MRN [id number]) as of 5/7/2018 13:03   Ref. Range 5/20/2017 08:10   WBC Latest Ref Range: 3.90 - 12.70 K/uL 5.03   RBC Latest Ref Range: 4.00 - 5.40 M/uL 4.66   Hemoglobin Latest Ref Range: 12.0 - 16.0 g/dL 13.5   Hematocrit Latest Ref Range: 37.0 - 48.5 % 42.2   MCV Latest Ref Range: 82 - 98 fL 91   MCH Latest Ref Range: 27.0 - 31.0 pg 29.0   MCHC Latest Ref Range: 32.0 - 36.0 % 32.0   RDW Latest Ref Range: 11.5 - 14.5 % 13.0   Platelets Latest Ref Range: 150 - 350 K/uL 140 (L)   MPV Latest Ref Range: 9.2 - 12.9 fL 11.0   Gran% Latest Ref Range: 38.0 - 73.0 % 58.1   Gran # (ANC) Latest Ref Range: 1.8 - 7.7 K/uL 2.9   Lymph% Latest Ref Range: 18.0 - 48.0 % 25.0   Lymph # Latest Ref Range: 1.0 - 4.8 K/uL 1.3   Mono% Latest Ref Range: 4.0 - 15.0 % 12.9   Mono # Latest Ref Range: 0.3 - 1.0 K/uL 0.7   Eosinophil% Latest Ref Range: 0.0 - 8.0 % 3.4   Eos # Latest Ref Range: 0.0 - 0.5 K/uL 0.2   Basophil% Latest Ref Range: 0.0 - 1.9 % 0.4   Baso # Latest Ref Range: 0.00 - 0.20 K/uL 0.02   Sodium Latest Ref Range: 136 - 145 mmol/L 139   Potassium Latest Ref Range: 3.5 - 5.1 mmol/L 3.8   Chloride Latest Ref Range: 95 - 110 mmol/L 103   CO2 Latest Ref Range: 23 - 29 mmol/L 27   Anion Gap Latest Ref Range: 8 - 16 mmol/L 9   BUN, Bld Latest Ref Range: 6 - 20 mg/dL 13   Creatinine Latest  Ref Range: 0.5 - 1.4 mg/dL 0.8   eGFR if non African American Latest Ref Range: >60 mL/min/1.73 m^2 >60.0   eGFR if African American Latest Ref Range: >60 mL/min/1.73 m^2 >60.0   Glucose Latest Ref Range: 70 - 110 mg/dL 97   Calcium Latest Ref Range: 8.7 - 10.5 mg/dL 9.3   Alkaline Phosphatase Latest Ref Range: 55 - 135 U/L 92   Total Protein Latest Ref Range: 6.0 - 8.4 g/dL 7.1   Albumin Latest Ref Range: 3.5 - 5.2 g/dL 3.8   Total Bilirubin Latest Ref Range: 0.1 - 1.0 mg/dL 0.4   AST Latest Ref Range: 10 - 40 U/L 34   ALT Latest Ref Range: 10 - 44 U/L 32   Triglycerides Latest Ref Range: 30 - 150 mg/dL 112   Cholesterol Latest Ref Range: 120 - 199 mg/dL 259 (H)   HDL Latest Ref Range: 40 - 75 mg/dL 53   LDL Cholesterol Latest Ref Range: 63.0 - 159.0 mg/dL 183.6 (H)   Total Cholesterol/HDL Ratio Latest Ref Range: 2.0 - 5.0  4.9   TSH Latest Ref Range: 0.400 - 4.000 uIU/mL 0.514   Differential Method Unknown Automated   HDL/Chol Ratio Latest Ref Range: 20.0 - 50.0 % 20.5   Non-HDL Cholesterol Latest Units: mg/dL 206       Assessment:     1. Hypothyroidism due to Hashimoto's thyroiditis  T4, free    T3    TSH    Thyroglobulin    Uric acid    CBC auto differential   2. Hashimoto's disease  Anti-thyroglobulin antibody    Thyroid peroxidase antibody    Thyroglobulin    US Soft Tissue Head Neck Thyroid    ACTH    Cortisol    21-Hydroxylase Antibodies, Serum    DHEA    DHEA-sulfate   3. Thyroiditis  Thyroglobulin   4. Goiter  Calcium, ionized    US Soft Tissue Head Neck Thyroid   5. Abnormal thyroid blood test     6. Adjustment disorder with mixed anxiety and depressed mood     7. Xerosis of skin     8. Hyperlipidemia, unspecified hyperlipidemia type  Comprehensive metabolic panel    Lipid panel   9. Essential hypertension  Uric acid    Comprehensive metabolic panel    Lipid panel    Microalbumin/creatinine urine ratio    Urinalysis   10. Obesity (BMI 30-39.9)     11. B12 deficiency  Vitamin B12    Vitamin B12 Deficiency  Panel    Folate RBC    Folate   12. Dysmetabolic syndrome  Comprehensive metabolic panel    Microalbumin/creatinine urine ratio    Urinalysis    Hemoglobin A1c   13. Fibromyalgia     14. Hypercholesterolemia     15. Hypovitaminosis D  Calcium, ionized    Vitamin D    PTH, intact   16. Postmenopausal  DXA Bone Density Spine And Hip   17. Chronic fatigue  ACTH    Cortisol    21-Hydroxylase Antibodies, Serum    DHEA    DHEA-sulfate      Regarding hypothyroidism; to continue Lt4 therapy as before and will obtain full TFTs and antibody profile as detailed above. To obtain screening thyroid USS to exclude any coexisiting thyroid nodular disease.  Regarding hyperlipidemia;  Will check lipid levels today and decide based on results regarding potential need for antilipidemic therapy.  Regarding depression; To continue SSRI therapy as before.  Regarding chronic fatigue and fibromyalgia; to continue ongoing ffup with Rheum service in this regard. Will obtain basic adrenal screening to exclude any significant adrenal insufficiency.  Regarding hypovitaminosis D; to check 25OH vit d levels and based on results may need to adjust current repletion dose.  Regarding hypertension; Presently suboptimally controlled.  To continue present antihypertensive regimen and continue serial tracking of ambulatory BP trends. If BP elevation persists @ ffup visit may needle medication dose adjustment.  Regarding Obesity and dystmetabolic syndreome; to obtain basic screening labs as detailed above. Her recent weight gain is multifactorial and this was discussed in face to face counselling of patient; tobacco cessation, recent post menopausal transition, high dose neurontin and Zoloft use are all contributors. Will discuss management strategy in more detail @ ffup visit.  Regarding vit B12 deficiency; to recheck Vit B12 and folate levels and will replete based on results.        Plan:     FFup in ~ ~ 3mths

## 2018-05-08 ENCOUNTER — PATIENT MESSAGE (OUTPATIENT)
Dept: ENDOCRINOLOGY | Facility: CLINIC | Age: 53
End: 2018-05-08

## 2018-05-08 DIAGNOSIS — E78.5 HYPERLIPIDEMIA, UNSPECIFIED HYPERLIPIDEMIA TYPE: ICD-10-CM

## 2018-05-08 DIAGNOSIS — E06.3 HASHIMOTO'S DISEASE: ICD-10-CM

## 2018-05-08 DIAGNOSIS — Z86.39 HISTORY OF VITAMIN D DEFICIENCY: ICD-10-CM

## 2018-05-08 DIAGNOSIS — E55.9 HYPOVITAMINOSIS D: Primary | ICD-10-CM

## 2018-05-08 DIAGNOSIS — E06.3 HYPOTHYROIDISM DUE TO HASHIMOTO'S THYROIDITIS: ICD-10-CM

## 2018-05-08 DIAGNOSIS — E53.8 VITAMIN B12 DEFICIENCY: ICD-10-CM

## 2018-05-08 DIAGNOSIS — E78.00 HYPERCHOLESTEROLEMIA: ICD-10-CM

## 2018-05-08 DIAGNOSIS — K29.70 GASTRITIS, PRESENCE OF BLEEDING UNSPECIFIED, UNSPECIFIED CHRONICITY, UNSPECIFIED GASTRITIS TYPE: ICD-10-CM

## 2018-05-08 DIAGNOSIS — E03.8 HYPOTHYROIDISM DUE TO HASHIMOTO'S THYROIDITIS: ICD-10-CM

## 2018-05-08 DIAGNOSIS — E53.8 VITAMIN B 12 DEFICIENCY: Primary | ICD-10-CM

## 2018-05-08 LAB
CORTIS SERPL-MCNC: 5.2 UG/DL
ESTIMATED AVG GLUCOSE: 105 MG/DL
HBA1C MFR BLD HPLC: 5.3 %
THYROGLOB AB SERPL IA-ACNC: 4486 IU/ML
THYROPEROXIDASE IGG SERPL-ACNC: 496 IU/ML

## 2018-05-08 RX ORDER — CYANOCOBALAMIN 1000 UG/ML
100 INJECTION, SOLUTION INTRAMUSCULAR; SUBCUTANEOUS
Status: SHIPPED | OUTPATIENT
Start: 2018-05-08 | End: 2018-11-04

## 2018-05-08 RX ORDER — PRAVASTATIN SODIUM 20 MG/1
20 TABLET ORAL DAILY
Qty: 90 TABLET | Refills: 3 | Status: SHIPPED | OUTPATIENT
Start: 2018-05-08 | End: 2019-12-16 | Stop reason: SDUPTHER

## 2018-05-08 RX ORDER — LEVOTHYROXINE SODIUM 125 UG/1
125 TABLET ORAL
Qty: 90 TABLET | Refills: 3 | Status: SHIPPED | OUTPATIENT
Start: 2018-05-08 | End: 2018-08-02 | Stop reason: SDUPTHER

## 2018-05-08 RX ORDER — ERGOCALCIFEROL 1.25 MG/1
CAPSULE ORAL
Qty: 24 CAPSULE | Refills: 3 | Status: SHIPPED | OUTPATIENT
Start: 2018-05-08 | End: 2019-02-05 | Stop reason: SDUPTHER

## 2018-05-08 RX ORDER — CYANOCOBALAMIN 1000 UG/ML
1000 INJECTION, SOLUTION INTRAMUSCULAR; SUBCUTANEOUS
Qty: 10 ML | Refills: 2 | Status: SHIPPED | OUTPATIENT
Start: 2018-05-08 | End: 2018-05-14

## 2018-05-09 LAB
ANNOTATION COMMENT IMP: NORMAL
FOLATE RBC-MCNC: 540 NG/ML
GASTRIN P FAST SERPL-MCNC: 25 PG/ML
IF BLOCK AB SER QL: NEGATIVE
THRYOGLOBULIN INTERPRETATION: ABNORMAL
THYROGLOB AB SERPL-ACNC: >500 IU/ML
THYROGLOB SERPL-MCNC: 2.9 NG/ML
VIT B12 SERPL-MCNC: 143 NG/L

## 2018-05-10 LAB — DHEA SERPL-MCNC: 1.12 NG/ML (ref 0.63–4.7)

## 2018-05-11 LAB
21HYDROXYLASE AB SER-ACNC: <1 U/ML
ACTH PLAS-MCNC: 19 PG/ML

## 2018-05-24 ENCOUNTER — HOSPITAL ENCOUNTER (OUTPATIENT)
Dept: RADIOLOGY | Facility: HOSPITAL | Age: 53
Discharge: HOME | End: 2018-05-24
Attending: INTERNAL MEDICINE
Payer: COMMERCIAL

## 2018-05-24 DIAGNOSIS — E06.3 HASHIMOTO'S DISEASE: ICD-10-CM

## 2018-05-24 DIAGNOSIS — E04.9 GOITER: ICD-10-CM

## 2018-05-24 DIAGNOSIS — Z78.0 POSTMENOPAUSAL: ICD-10-CM

## 2018-05-24 PROCEDURE — 76536 US EXAM OF HEAD AND NECK: CPT | Mod: TC

## 2018-05-24 PROCEDURE — 77080 DXA BONE DENSITY AXIAL: CPT | Mod: TC

## 2018-05-24 PROCEDURE — 76536 US EXAM OF HEAD AND NECK: CPT | Mod: 26,,, | Performed by: RADIOLOGY

## 2018-05-24 PROCEDURE — 77080 DXA BONE DENSITY AXIAL: CPT | Mod: 26,,, | Performed by: RADIOLOGY

## 2018-05-28 ENCOUNTER — OFFICE VISIT (OUTPATIENT)
Dept: RHEUMATOLOGY | Facility: CLINIC | Age: 53
End: 2018-05-28
Payer: COMMERCIAL

## 2018-05-28 VITALS
DIASTOLIC BLOOD PRESSURE: 82 MMHG | HEART RATE: 66 BPM | WEIGHT: 196.19 LBS | BODY MASS INDEX: 33.68 KG/M2 | SYSTOLIC BLOOD PRESSURE: 121 MMHG

## 2018-05-28 DIAGNOSIS — M79.7 FIBROMYALGIA: Primary | ICD-10-CM

## 2018-05-28 PROBLEM — K29.40 AUTOIMMUNE GASTRITIS: Status: ACTIVE | Noted: 2018-05-28

## 2018-05-28 PROCEDURE — 3079F DIAST BP 80-89 MM HG: CPT | Mod: CPTII,S$GLB,, | Performed by: INTERNAL MEDICINE

## 2018-05-28 PROCEDURE — 3008F BODY MASS INDEX DOCD: CPT | Mod: CPTII,S$GLB,, | Performed by: INTERNAL MEDICINE

## 2018-05-28 PROCEDURE — 99999 PR PBB SHADOW E&M-EST. PATIENT-LVL III: CPT | Mod: PBBFAC,,, | Performed by: INTERNAL MEDICINE

## 2018-05-28 PROCEDURE — 3074F SYST BP LT 130 MM HG: CPT | Mod: CPTII,S$GLB,, | Performed by: INTERNAL MEDICINE

## 2018-05-28 PROCEDURE — 99213 OFFICE O/P EST LOW 20 MIN: CPT | Mod: S$GLB,,, | Performed by: INTERNAL MEDICINE

## 2018-05-28 ASSESSMENT — ROUTINE ASSESSMENT OF PATIENT INDEX DATA (RAPID3)
WHEN YOU AWAKENED IN THE MORNING OVER THE LAST WEEK, PLEASE INDICATE THE AMOUNT OF TIME IT TAKES UNTIL YOU ARE AS LIMBER AS YOU WILL BE FOR THE DAY: 2 HR
FATIGUE SCORE: 8
PATIENT GLOBAL ASSESSMENT SCORE: 5.5
MDHAQ FUNCTION SCORE: 1.3
PAIN SCORE: 8
TOTAL RAPID3 SCORE: 5.94
PSYCHOLOGICAL DISTRESS SCORE: 5.5
AM STIFFNESS SCORE: 1, YES

## 2018-05-28 NOTE — PROGRESS NOTES
History of present illness: 53-year-old female who was originally referred because of a positive FRANCISCO.  She had no evidence of an underlying connective tissue disease.  She has Hashimoto's thyroiditis and this was felt to be the most likely cause of her positive FRANCISCO.  She has a diffuse pain syndrome compatible with fibromyalgia.  She was last seen in December.  At that time I placed her on gabapentin 300 mg 3 times daily.  She did not tolerate the gabapentin and decreased it to twice daily.  This is giving her some relief but her pain persists.  Heat and topical medications have been helping.  She is taking no over-the-counter medications for the pain.  The pain is worse after activity.  It does not wake her up at night.  She does feel bad in the morning.  She has had no joint swelling.  She has had no other recent medical problems.    Physical examination:  Musculoskeletal: She has good range of motion of all joints.  She has no synovitis.  She hasn't scattered tender areas to palpation.    Assessment: Fibromyalgia    Plans:  1.  I changed her gabapentin to 300 mg at noon time and X 100 mg at bedtime  2.  I told her she could take Tylenol up to 3 times daily  3.  Return to see me in 6 months

## 2018-06-11 DIAGNOSIS — I10 ESSENTIAL HYPERTENSION: ICD-10-CM

## 2018-06-11 DIAGNOSIS — F43.23 ADJUSTMENT DISORDER WITH MIXED ANXIETY AND DEPRESSED MOOD: ICD-10-CM

## 2018-06-11 RX ORDER — SERTRALINE HYDROCHLORIDE 50 MG/1
50 TABLET, FILM COATED ORAL DAILY
Qty: 90 TABLET | Refills: 0 | Status: SHIPPED | OUTPATIENT
Start: 2018-06-11 | End: 2018-08-02 | Stop reason: SDUPTHER

## 2018-06-11 RX ORDER — BENAZEPRIL HYDROCHLORIDE AND HYDROCHLOROTHIAZIDE 10; 12.5 MG/1; MG/1
1 TABLET ORAL DAILY
Qty: 90 TABLET | Refills: 0 | Status: SHIPPED | OUTPATIENT
Start: 2018-06-11 | End: 2018-08-02 | Stop reason: SDUPTHER

## 2018-06-11 NOTE — TELEPHONE ENCOUNTER
"----- Message from Lula Ferrer sent at 2018 11:37 AM CDT -----  Contact: Self [phone]  RX request - refill or new RX.  Is this a refill or new RX:  Refill  RX name and strength: sertraline (ZOLOFT) 50 MG tablet ()  Directions:   Is this a 30 day or 90 day RX: 90 day  Local pharmacy or mail order pharmacy:    Pharmacy name and phone # (DON'T enter "on file" or "in chart"): [facility] [address] [phone] (Phone)  [phone] (Fax)  Comments:      RX request - refill or new RX.  Is this a refill or new RX:    RX name and strength: benazepril-hydrochlorthiazide (LOTENSIN HCT) 10-12.5 mg Tab  Directions:   Is this a 30 day or 90 day RX:  90 day  Local pharmacy or mail order pharmacy:    Pharmacy name and phone # (DON'T enter "on file" or "in chart"): [facility] [address] [phone] (Phone)  [phone] (Fax)  Comments:  Pt is leaving Friday to go out of town, and in another month Pt will schedule an appointment with the doctor, Pt is not able to take off from work.      "

## 2018-07-16 ENCOUNTER — PATIENT MESSAGE (OUTPATIENT)
Dept: FAMILY MEDICINE | Facility: CLINIC | Age: 53
End: 2018-07-16

## 2018-07-16 DIAGNOSIS — Z00.00 ROUTINE GENERAL MEDICAL EXAMINATION AT A HEALTH CARE FACILITY: Primary | ICD-10-CM

## 2018-07-20 ENCOUNTER — PATIENT MESSAGE (OUTPATIENT)
Dept: ADMINISTRATIVE | Facility: OTHER | Age: 53
End: 2018-07-20

## 2018-07-20 ENCOUNTER — TELEPHONE (OUTPATIENT)
Dept: FAMILY MEDICINE | Facility: CLINIC | Age: 53
End: 2018-07-20

## 2018-07-20 NOTE — TELEPHONE ENCOUNTER
----- Message from Jenny Mccann sent at 7/20/2018 12:25 PM CDT -----  Contact: fyi  Doctor appointment and lab have been scheduled.  Please link lab orders to the lab appointment.  Date of doctor appointment:  08/02/18  Physical or EP:  physical  Date of lab appointment:  07/29/18  Comments:

## 2018-07-25 ENCOUNTER — PATIENT MESSAGE (OUTPATIENT)
Dept: ADMINISTRATIVE | Facility: OTHER | Age: 53
End: 2018-07-25

## 2018-07-28 ENCOUNTER — LAB VISIT (OUTPATIENT)
Dept: LAB | Facility: HOSPITAL | Age: 53
End: 2018-07-28
Attending: INTERNAL MEDICINE
Payer: COMMERCIAL

## 2018-07-28 DIAGNOSIS — Z00.00 ROUTINE GENERAL MEDICAL EXAMINATION AT A HEALTH CARE FACILITY: ICD-10-CM

## 2018-07-28 LAB
ANION GAP SERPL CALC-SCNC: 6 MMOL/L
BUN SERPL-MCNC: 13 MG/DL
CALCIUM SERPL-MCNC: 9.8 MG/DL
CHLORIDE SERPL-SCNC: 104 MMOL/L
CHOLEST SERPL-MCNC: 204 MG/DL
CHOLEST/HDLC SERPL: 3.5 {RATIO}
CO2 SERPL-SCNC: 31 MMOL/L
CREAT SERPL-MCNC: 0.8 MG/DL
EST. GFR  (AFRICAN AMERICAN): >60 ML/MIN/1.73 M^2
EST. GFR  (NON AFRICAN AMERICAN): >60 ML/MIN/1.73 M^2
GLUCOSE SERPL-MCNC: 104 MG/DL
HDLC SERPL-MCNC: 59 MG/DL
HDLC SERPL: 28.9 %
LDLC SERPL CALC-MCNC: 126.2 MG/DL
NONHDLC SERPL-MCNC: 145 MG/DL
POTASSIUM SERPL-SCNC: 3.9 MMOL/L
SODIUM SERPL-SCNC: 141 MMOL/L
T4 FREE SERPL-MCNC: 1.26 NG/DL
TRIGL SERPL-MCNC: 94 MG/DL
TSH SERPL DL<=0.005 MIU/L-ACNC: 0.27 UIU/ML

## 2018-07-28 PROCEDURE — 80048 BASIC METABOLIC PNL TOTAL CA: CPT

## 2018-07-28 PROCEDURE — 84439 ASSAY OF FREE THYROXINE: CPT

## 2018-07-28 PROCEDURE — 80061 LIPID PANEL: CPT

## 2018-07-28 PROCEDURE — 36415 COLL VENOUS BLD VENIPUNCTURE: CPT | Mod: PO

## 2018-07-28 PROCEDURE — 84443 ASSAY THYROID STIM HORMONE: CPT

## 2018-08-02 ENCOUNTER — OFFICE VISIT (OUTPATIENT)
Dept: FAMILY MEDICINE | Facility: CLINIC | Age: 53
End: 2018-08-02
Payer: COMMERCIAL

## 2018-08-02 VITALS
WEIGHT: 192 LBS | TEMPERATURE: 98 F | DIASTOLIC BLOOD PRESSURE: 80 MMHG | SYSTOLIC BLOOD PRESSURE: 124 MMHG | BODY MASS INDEX: 31.99 KG/M2 | HEIGHT: 65 IN | HEART RATE: 70 BPM

## 2018-08-02 DIAGNOSIS — M25.50 POLYARTHRALGIA: ICD-10-CM

## 2018-08-02 DIAGNOSIS — F43.23 ADJUSTMENT DISORDER WITH MIXED ANXIETY AND DEPRESSED MOOD: ICD-10-CM

## 2018-08-02 DIAGNOSIS — Z78.0 POSTMENOPAUSAL: ICD-10-CM

## 2018-08-02 DIAGNOSIS — Z00.00 ANNUAL PHYSICAL EXAM: Primary | ICD-10-CM

## 2018-08-02 DIAGNOSIS — E03.8 HYPOTHYROIDISM DUE TO HASHIMOTO'S THYROIDITIS: ICD-10-CM

## 2018-08-02 DIAGNOSIS — I10 ESSENTIAL HYPERTENSION: ICD-10-CM

## 2018-08-02 DIAGNOSIS — E66.9 OBESITY (BMI 30-39.9): ICD-10-CM

## 2018-08-02 DIAGNOSIS — E06.3 HYPOTHYROIDISM DUE TO HASHIMOTO'S THYROIDITIS: ICD-10-CM

## 2018-08-02 DIAGNOSIS — E78.5 HYPERLIPIDEMIA, UNSPECIFIED HYPERLIPIDEMIA TYPE: ICD-10-CM

## 2018-08-02 DIAGNOSIS — G47.9 DIFFICULTY SLEEPING: ICD-10-CM

## 2018-08-02 DIAGNOSIS — M79.7 FIBROMYALGIA: ICD-10-CM

## 2018-08-02 PROCEDURE — 99396 PREV VISIT EST AGE 40-64: CPT | Mod: S$GLB,,, | Performed by: INTERNAL MEDICINE

## 2018-08-02 PROCEDURE — 99999 PR PBB SHADOW E&M-EST. PATIENT-LVL III: CPT | Mod: PBBFAC,,, | Performed by: INTERNAL MEDICINE

## 2018-08-02 PROCEDURE — 3074F SYST BP LT 130 MM HG: CPT | Mod: CPTII,S$GLB,, | Performed by: INTERNAL MEDICINE

## 2018-08-02 PROCEDURE — 3079F DIAST BP 80-89 MM HG: CPT | Mod: CPTII,S$GLB,, | Performed by: INTERNAL MEDICINE

## 2018-08-02 RX ORDER — GABAPENTIN 100 MG/1
100 CAPSULE ORAL EVERY MORNING
Qty: 30 CAPSULE | Refills: 11 | Status: SHIPPED | OUTPATIENT
Start: 2018-08-02 | End: 2018-10-09 | Stop reason: DRUGHIGH

## 2018-08-02 RX ORDER — LEVOTHYROXINE SODIUM 112 UG/1
112 TABLET ORAL
Qty: 90 TABLET | Refills: 0 | Status: SHIPPED | OUTPATIENT
Start: 2018-08-02 | End: 2018-10-11 | Stop reason: SDUPTHER

## 2018-08-02 RX ORDER — CYANOCOBALAMIN 1000 UG/ML
INJECTION, SOLUTION INTRAMUSCULAR; SUBCUTANEOUS
COMMUNITY
Start: 2018-07-05 | End: 2021-04-21 | Stop reason: SDUPTHER

## 2018-08-02 RX ORDER — BENAZEPRIL HYDROCHLORIDE AND HYDROCHLOROTHIAZIDE 10; 12.5 MG/1; MG/1
1 TABLET ORAL DAILY
Qty: 90 TABLET | Refills: 3 | Status: SHIPPED | OUTPATIENT
Start: 2018-08-02 | End: 2019-12-16 | Stop reason: SDUPTHER

## 2018-08-02 RX ORDER — SERTRALINE HYDROCHLORIDE 50 MG/1
50 TABLET, FILM COATED ORAL DAILY
Qty: 90 TABLET | Refills: 3 | Status: SHIPPED | OUTPATIENT
Start: 2018-08-02 | End: 2019-08-02

## 2018-08-02 NOTE — PROGRESS NOTES
Subjective:        Patient ID: Winifred Love is a 53 y.o. female.    Chief Complaint: Annual Exam    HPI   Winifred Love presents for annual exam.  No new concerns today.    Review of Systems   Constitutional: Negative for activity change (gardening regularly) and unexpected weight change.   HENT: Negative for hearing loss.    Eyes: Negative for visual disturbance (picking up new glasses in a few days).   Respiratory: Negative for chest tightness and shortness of breath.    Cardiovascular: Negative for chest pain and leg swelling.   Gastrointestinal: Negative for abdominal pain.   Musculoskeletal: Positive for arthralgias (chronic).   Skin: Negative for rash.   Psychiatric/Behavioral:        - still gets upset about the house she's living in but otherwise doing well mood-wise           Objective:        Vitals:    08/02/18 0829   BP: 124/80   Pulse: 70   Temp: 98.2 °F (36.8 °C)     Physical Exam   Constitutional: She is oriented to person, place, and time. She appears well-developed and well-nourished. No distress.   HENT:   Head: Normocephalic and atraumatic.   Right Ear: External ear normal.   Left Ear: External ear normal.   Nose: Nose normal.   - bilateral ear canals clear, tympanic membranes visualized - normal color and light reflex   Eyes: Conjunctivae and EOM are normal.   Neck: Normal range of motion. Neck supple. No thyromegaly present.   Cardiovascular: Normal rate, regular rhythm and normal heart sounds.    No murmur heard.  Pulmonary/Chest: Effort normal and breath sounds normal. No respiratory distress.   Abdominal: Soft. She exhibits no distension and no mass. There is no tenderness. There is no guarding.   Musculoskeletal: She exhibits no edema.   Lymphadenopathy:     She has no cervical adenopathy.   Neurological: She is alert and oriented to person, place, and time.   Skin: Skin is warm and dry.   Psychiatric: She has a normal mood and affect. Her behavior is normal. Judgment and  thought content normal.   Vitals reviewed.      Most recent lab results reviewed with patient.    Assessment:         1. Annual physical exam    2. Adjustment disorder with mixed anxiety and depressed mood    3. Essential hypertension    4. Hyperlipidemia, unspecified hyperlipidemia type    5. Postmenopausal    6. Hypothyroidism due to Hashimoto's thyroiditis    7. Obesity (BMI 30-39.9)    8. Fibromyalgia    9. Polyarthralgia    10. Difficulty sleeping              Plan:         Winifred was seen today for annual exam.    Diagnoses and all orders for this visit:    Annual physical exam  - declines all cancer screenings    Adjustment disorder with mixed anxiety and depressed mood: stable on sertraline 50mg qd  -     sertraline (ZOLOFT) 50 MG tablet; Take 1 tablet (50 mg total) by mouth once daily.    Essential hypertension: well controlled; continue benazepril/hctz 10/12.5mg qd  -     benazepril-hydrochlorthiazide (LOTENSIN HCT) 10-12.5 mg Tab; Take 1 tablet by mouth once daily.    Hyperlipidemia, unspecified hyperlipidemia type: lipids improved with pravastatin 20mg qd    Postmenopausal: no acute issues    Hypothyroidism due to Hashimoto's thyroiditis: Decrease Levothyroxine to 112mcg daily and recheck TSH in 2 months.  -     TSH; Future  -     levothyroxine (SYNTHROID) 112 MCG tablet; Take 1 tablet (112 mcg total) by mouth before breakfast.    Obesity (BMI 30-39.9): stable; pt staying active with gardening    Fibromyalgia  Polyarthralgia: Currently taking Gabapentin 300mg in the afternoon and 600mg qhs due to delayed effect of wooziness after taking.  Try 100mg qAM and see if she can tolerate this dose while at work.  Pt will send me a message and let me know how it's working.  Follow up with Rheum.  -     gabapentin (NEURONTIN) 100 MG capsule; Take 1 capsule (100 mg total) by mouth every morning.    Difficulty sleeping: Unisom PRN        Follow-up in about 2 months (around 10/2/2018) for NON FASTING Lab  appointment.

## 2018-08-17 ENCOUNTER — PATIENT MESSAGE (OUTPATIENT)
Dept: ENDOCRINOLOGY | Facility: CLINIC | Age: 53
End: 2018-08-17

## 2018-09-04 ENCOUNTER — PATIENT MESSAGE (OUTPATIENT)
Dept: ENDOCRINOLOGY | Facility: CLINIC | Age: 53
End: 2018-09-04

## 2018-10-09 ENCOUNTER — LAB VISIT (OUTPATIENT)
Dept: LAB | Facility: HOSPITAL | Age: 53
End: 2018-10-09
Attending: INTERNAL MEDICINE
Payer: COMMERCIAL

## 2018-10-09 ENCOUNTER — OFFICE VISIT (OUTPATIENT)
Dept: FAMILY MEDICINE | Facility: CLINIC | Age: 53
End: 2018-10-09
Payer: COMMERCIAL

## 2018-10-09 VITALS
RESPIRATION RATE: 20 BRPM | BODY MASS INDEX: 32.76 KG/M2 | SYSTOLIC BLOOD PRESSURE: 120 MMHG | HEIGHT: 65 IN | DIASTOLIC BLOOD PRESSURE: 72 MMHG | WEIGHT: 196.63 LBS | TEMPERATURE: 98 F

## 2018-10-09 DIAGNOSIS — E06.3 HYPOTHYROIDISM DUE TO HASHIMOTO'S THYROIDITIS: Primary | ICD-10-CM

## 2018-10-09 DIAGNOSIS — Z23 NEED FOR PROPHYLACTIC VACCINATION AND INOCULATION AGAINST INFLUENZA: ICD-10-CM

## 2018-10-09 DIAGNOSIS — E06.3 HYPOTHYROIDISM DUE TO HASHIMOTO'S THYROIDITIS: ICD-10-CM

## 2018-10-09 DIAGNOSIS — M79.7 FIBROMYALGIA: ICD-10-CM

## 2018-10-09 DIAGNOSIS — E03.8 HYPOTHYROIDISM DUE TO HASHIMOTO'S THYROIDITIS: ICD-10-CM

## 2018-10-09 DIAGNOSIS — E03.8 HYPOTHYROIDISM DUE TO HASHIMOTO'S THYROIDITIS: Primary | ICD-10-CM

## 2018-10-09 DIAGNOSIS — M25.50 POLYARTHRALGIA: ICD-10-CM

## 2018-10-09 LAB — TSH SERPL DL<=0.005 MIU/L-ACNC: 1.16 UIU/ML

## 2018-10-09 PROCEDURE — 3074F SYST BP LT 130 MM HG: CPT | Mod: CPTII,S$GLB,, | Performed by: INTERNAL MEDICINE

## 2018-10-09 PROCEDURE — 90471 IMMUNIZATION ADMIN: CPT | Mod: S$GLB,,, | Performed by: INTERNAL MEDICINE

## 2018-10-09 PROCEDURE — 99999 PR PBB SHADOW E&M-EST. PATIENT-LVL III: CPT | Mod: PBBFAC,,, | Performed by: INTERNAL MEDICINE

## 2018-10-09 PROCEDURE — 3078F DIAST BP <80 MM HG: CPT | Mod: CPTII,S$GLB,, | Performed by: INTERNAL MEDICINE

## 2018-10-09 PROCEDURE — 84443 ASSAY THYROID STIM HORMONE: CPT

## 2018-10-09 PROCEDURE — 3008F BODY MASS INDEX DOCD: CPT | Mod: CPTII,S$GLB,, | Performed by: INTERNAL MEDICINE

## 2018-10-09 PROCEDURE — 90686 IIV4 VACC NO PRSV 0.5 ML IM: CPT | Mod: S$GLB,,, | Performed by: INTERNAL MEDICINE

## 2018-10-09 PROCEDURE — 99214 OFFICE O/P EST MOD 30 MIN: CPT | Mod: 25,S$GLB,, | Performed by: INTERNAL MEDICINE

## 2018-10-09 PROCEDURE — 36415 COLL VENOUS BLD VENIPUNCTURE: CPT | Mod: PO

## 2018-10-09 RX ORDER — GABAPENTIN 300 MG/1
300 CAPSULE ORAL
COMMUNITY
Start: 2018-09-06 | End: 2019-12-16 | Stop reason: SDUPTHER

## 2018-10-09 NOTE — PROGRESS NOTES
Two patient identifiers verified.  Allergies reviewed.  Flu given IM administered to right deltoid per MD order.  Patient tolerated injection well; no redness, bleeding, or bruising noted to injection site.  Patient instructed to remain in clinic setting for 15 minutes.  Verbalizes understanding.  Flu consent signed by patient.

## 2018-10-09 NOTE — PROGRESS NOTES
"Subjective:        Patient ID: Winifred Love is a 53 y.o. female.    Chief Complaint: Thyroid Problem; Dizziness (work related); and Fatigue (work related)    HPI   Winifred Love presents for follow up of thyroid disorder.  Pt c/o mental and physical exhaustion from work.  She is currently working up to 65-70 hours/week and has not had a day off since August.  She reports irritability, fatigue, difficulty sleeping and chronic polyarthralgias.      Pt would like a letter stating that, due to her thyroid disorder, her work hours need to be limited to 40 hours/week or a full time schedule without the "mandatory overtime" that she is being pressured to work.  She reports the underlying tone is that she is replaceable if she is not willing to work the mandatory overtime.    Review of Systems  as per HPI      Objective:        Vitals:    10/09/18 0943   BP: 120/72   Resp: 20   Temp: 98.1 °F (36.7 °C)     Physical Exam   Constitutional: She is oriented to person, place, and time. She appears well-developed and well-nourished. She appears distressed (anxious, frustrated).   HENT:   Head: Normocephalic and atraumatic.   Neurological: She is alert and oriented to person, place, and time.   Skin: Skin is warm and dry.   Psychiatric:   Affect appropriate   Vitals reviewed.          Assessment:         1. Hypothyroidism due to Hashimoto's thyroiditis    2. Fibromyalgia    3. Polyarthralgia    4. Need for prophylactic vaccination and inoculation against influenza              Plan:         Winifred was seen today for thyroid problem, dizziness and fatigue.    Diagnoses and all orders for this visit:    Hypothyroidism due to Hashimoto's thyroiditis: Check TSH today.  Counseled pt that although uncontrolled thyroid DO can contribute to many non specific sx, I am not able to write a letter excusing her from work due to her hypothyroidism.  I discussed with patient that fatigue, difficulty sleeping, irritability and " difficulties with losing weight are usually multifactorial, in this case likely exacerbated by her work schedule.     Fibromyalgia  Polyarthralgia: Pt reports no improvement with gabapentin and it just makes her woozy.  Advised her to f/u with rheum to discuss weaning off medication and if she would like to get a second opinion regarding treatment of her polyarthralgias.  -     Ambulatory referral to Rheumatology    Need for prophylactic vaccination and inoculation against influenza  -     Flu Vaccine - Quadrivalent (PF) (3 years & older)        Follow-up for pending lab results.

## 2018-10-11 ENCOUNTER — PATIENT MESSAGE (OUTPATIENT)
Dept: FAMILY MEDICINE | Facility: CLINIC | Age: 53
End: 2018-10-11

## 2018-10-11 DIAGNOSIS — E06.3 HYPOTHYROIDISM DUE TO HASHIMOTO'S THYROIDITIS: ICD-10-CM

## 2018-10-11 DIAGNOSIS — E03.8 HYPOTHYROIDISM DUE TO HASHIMOTO'S THYROIDITIS: ICD-10-CM

## 2018-10-11 RX ORDER — LEVOTHYROXINE SODIUM 112 UG/1
112 TABLET ORAL
Qty: 90 TABLET | Refills: 3 | Status: SHIPPED | OUTPATIENT
Start: 2018-10-11 | End: 2019-02-05 | Stop reason: SDUPTHER

## 2018-10-19 NOTE — TELEPHONE ENCOUNTER
Notified patient that there is an unread message in [facility] from Dr. Edmonds. Read message. Patient verbalizes understanding.

## 2019-02-05 ENCOUNTER — OFFICE VISIT (OUTPATIENT)
Dept: ENDOCRINOLOGY | Facility: CLINIC | Age: 54
End: 2019-02-05
Payer: COMMERCIAL

## 2019-02-05 ENCOUNTER — LAB VISIT (OUTPATIENT)
Dept: LAB | Facility: HOSPITAL | Age: 54
End: 2019-02-05
Attending: INTERNAL MEDICINE
Payer: COMMERCIAL

## 2019-02-05 ENCOUNTER — PATIENT MESSAGE (OUTPATIENT)
Dept: ENDOCRINOLOGY | Facility: CLINIC | Age: 54
End: 2019-02-05

## 2019-02-05 VITALS
DIASTOLIC BLOOD PRESSURE: 89 MMHG | HEIGHT: 65 IN | BODY MASS INDEX: 33.28 KG/M2 | SYSTOLIC BLOOD PRESSURE: 147 MMHG | RESPIRATION RATE: 18 BRPM | HEART RATE: 78 BPM | TEMPERATURE: 98 F | WEIGHT: 199.75 LBS

## 2019-02-05 DIAGNOSIS — F43.23 ADJUSTMENT DISORDER WITH MIXED ANXIETY AND DEPRESSED MOOD: ICD-10-CM

## 2019-02-05 DIAGNOSIS — E06.3 HASHIMOTO'S DISEASE: ICD-10-CM

## 2019-02-05 DIAGNOSIS — E06.3 HYPOTHYROIDISM DUE TO HASHIMOTO'S THYROIDITIS: ICD-10-CM

## 2019-02-05 DIAGNOSIS — E03.9 HYPOTHYROIDISM (ACQUIRED): Primary | ICD-10-CM

## 2019-02-05 DIAGNOSIS — E66.9 OBESITY (BMI 30-39.9): ICD-10-CM

## 2019-02-05 DIAGNOSIS — E78.5 HYPERLIPIDEMIA, UNSPECIFIED HYPERLIPIDEMIA TYPE: ICD-10-CM

## 2019-02-05 DIAGNOSIS — E06.9 THYROIDITIS: ICD-10-CM

## 2019-02-05 DIAGNOSIS — E55.9 HYPOVITAMINOSIS D: ICD-10-CM

## 2019-02-05 DIAGNOSIS — K29.40 ATROPHIC GASTRITIS WITHOUT HEMORRHAGE: ICD-10-CM

## 2019-02-05 DIAGNOSIS — E04.9 GOITER: ICD-10-CM

## 2019-02-05 DIAGNOSIS — I10 ESSENTIAL HYPERTENSION: ICD-10-CM

## 2019-02-05 DIAGNOSIS — Z78.0 POSTMENOPAUSAL: ICD-10-CM

## 2019-02-05 DIAGNOSIS — E88.810 DYSMETABOLIC SYNDROME: ICD-10-CM

## 2019-02-05 DIAGNOSIS — E53.8 B12 DEFICIENCY: ICD-10-CM

## 2019-02-05 DIAGNOSIS — Z86.39 HISTORY OF VITAMIN D DEFICIENCY: ICD-10-CM

## 2019-02-05 DIAGNOSIS — E03.8 HYPOTHYROIDISM DUE TO HASHIMOTO'S THYROIDITIS: ICD-10-CM

## 2019-02-05 DIAGNOSIS — M79.7 FIBROMYALGIA: ICD-10-CM

## 2019-02-05 DIAGNOSIS — E03.9 HYPOTHYROIDISM (ACQUIRED): ICD-10-CM

## 2019-02-05 LAB
25(OH)D3+25(OH)D2 SERPL-MCNC: 20 NG/ML
ALBUMIN SERPL BCP-MCNC: 3.7 G/DL
ALP SERPL-CCNC: 88 U/L
ALT SERPL W/O P-5'-P-CCNC: 12 U/L
ANION GAP SERPL CALC-SCNC: 6 MMOL/L
AST SERPL-CCNC: 16 U/L
BASOPHILS # BLD AUTO: 0.04 K/UL
BASOPHILS NFR BLD: 0.4 %
BILIRUB SERPL-MCNC: 0.4 MG/DL
BUN SERPL-MCNC: 14 MG/DL
CA-I BLDV-SCNC: 1.23 MMOL/L
CALCIUM SERPL-MCNC: 9.8 MG/DL
CHLORIDE SERPL-SCNC: 105 MMOL/L
CHOLEST SERPL-MCNC: 248 MG/DL
CHOLEST/HDLC SERPL: 3.4 {RATIO}
CO2 SERPL-SCNC: 31 MMOL/L
CREAT SERPL-MCNC: 0.8 MG/DL
DIFFERENTIAL METHOD: ABNORMAL
EOSINOPHIL # BLD AUTO: 0 K/UL
EOSINOPHIL NFR BLD: 0.3 %
ERYTHROCYTE [DISTWIDTH] IN BLOOD BY AUTOMATED COUNT: 13.2 %
EST. GFR  (AFRICAN AMERICAN): >60 ML/MIN/1.73 M^2
EST. GFR  (NON AFRICAN AMERICAN): >60 ML/MIN/1.73 M^2
ESTIMATED AVG GLUCOSE: 114 MG/DL
FOLATE SERPL-MCNC: 7.3 NG/ML
GLUCOSE SERPL-MCNC: 89 MG/DL
HBA1C MFR BLD HPLC: 5.6 %
HCT VFR BLD AUTO: 44.3 %
HDLC SERPL-MCNC: 72 MG/DL
HDLC SERPL: 29 %
HGB BLD-MCNC: 13.8 G/DL
IMM GRANULOCYTES # BLD AUTO: 0.03 K/UL
IMM GRANULOCYTES NFR BLD AUTO: 0.3 %
LDLC SERPL CALC-MCNC: 160.4 MG/DL
LYMPHOCYTES # BLD AUTO: 1.4 K/UL
LYMPHOCYTES NFR BLD: 15.1 %
MCH RBC QN AUTO: 28.3 PG
MCHC RBC AUTO-ENTMCNC: 31.2 G/DL
MCV RBC AUTO: 91 FL
MONOCYTES # BLD AUTO: 0.6 K/UL
MONOCYTES NFR BLD: 6.9 %
NEUTROPHILS # BLD AUTO: 6.9 K/UL
NEUTROPHILS NFR BLD: 77 %
NONHDLC SERPL-MCNC: 176 MG/DL
NRBC BLD-RTO: 0 /100 WBC
PLATELET # BLD AUTO: 204 K/UL
PMV BLD AUTO: 10.9 FL
POTASSIUM SERPL-SCNC: 3.5 MMOL/L
PROT SERPL-MCNC: 7.3 G/DL
PTH-INTACT SERPL-MCNC: 68 PG/ML
RBC # BLD AUTO: 4.88 M/UL
SODIUM SERPL-SCNC: 142 MMOL/L
T3 SERPL-MCNC: 76 NG/DL
T4 FREE SERPL-MCNC: 0.79 NG/DL
TRIGL SERPL-MCNC: 78 MG/DL
TSH SERPL DL<=0.005 MIU/L-ACNC: 8.38 UIU/ML
URATE SERPL-MCNC: 4.7 MG/DL
VIT B12 SERPL-MCNC: 264 PG/ML
WBC # BLD AUTO: 9.01 K/UL

## 2019-02-05 PROCEDURE — 83921 ORGANIC ACID SINGLE QUANT: CPT

## 2019-02-05 PROCEDURE — 3077F SYST BP >= 140 MM HG: CPT | Mod: CPTII,S$GLB,, | Performed by: INTERNAL MEDICINE

## 2019-02-05 PROCEDURE — 84443 ASSAY THYROID STIM HORMONE: CPT

## 2019-02-05 PROCEDURE — 82306 VITAMIN D 25 HYDROXY: CPT

## 2019-02-05 PROCEDURE — 80053 COMPREHEN METABOLIC PANEL: CPT

## 2019-02-05 PROCEDURE — 3008F BODY MASS INDEX DOCD: CPT | Mod: CPTII,S$GLB,, | Performed by: INTERNAL MEDICINE

## 2019-02-05 PROCEDURE — 3008F PR BODY MASS INDEX (BMI) DOCUMENTED: ICD-10-PCS | Mod: CPTII,S$GLB,, | Performed by: INTERNAL MEDICINE

## 2019-02-05 PROCEDURE — 99214 PR OFFICE/OUTPT VISIT, EST, LEVL IV, 30-39 MIN: ICD-10-PCS | Mod: S$GLB,,, | Performed by: INTERNAL MEDICINE

## 2019-02-05 PROCEDURE — 82607 VITAMIN B-12: CPT

## 2019-02-05 PROCEDURE — 86800 THYROGLOBULIN ANTIBODY: CPT

## 2019-02-05 PROCEDURE — 99214 OFFICE O/P EST MOD 30 MIN: CPT | Mod: S$GLB,,, | Performed by: INTERNAL MEDICINE

## 2019-02-05 PROCEDURE — 82747 ASSAY OF FOLIC ACID RBC: CPT

## 2019-02-05 PROCEDURE — 82607 VITAMIN B-12: CPT | Mod: 91

## 2019-02-05 PROCEDURE — 3079F PR MOST RECENT DIASTOLIC BLOOD PRESSURE 80-89 MM HG: ICD-10-PCS | Mod: CPTII,S$GLB,, | Performed by: INTERNAL MEDICINE

## 2019-02-05 PROCEDURE — 84439 ASSAY OF FREE THYROXINE: CPT

## 2019-02-05 PROCEDURE — 3077F PR MOST RECENT SYSTOLIC BLOOD PRESSURE >= 140 MM HG: ICD-10-PCS | Mod: CPTII,S$GLB,, | Performed by: INTERNAL MEDICINE

## 2019-02-05 PROCEDURE — 84480 ASSAY TRIIODOTHYRONINE (T3): CPT

## 2019-02-05 PROCEDURE — 80061 LIPID PANEL: CPT

## 2019-02-05 PROCEDURE — 99999 PR PBB SHADOW E&M-EST. PATIENT-LVL IV: ICD-10-PCS | Mod: PBBFAC,,, | Performed by: INTERNAL MEDICINE

## 2019-02-05 PROCEDURE — 82330 ASSAY OF CALCIUM: CPT

## 2019-02-05 PROCEDURE — 83036 HEMOGLOBIN GLYCOSYLATED A1C: CPT

## 2019-02-05 PROCEDURE — 83970 ASSAY OF PARATHORMONE: CPT

## 2019-02-05 PROCEDURE — 99999 PR PBB SHADOW E&M-EST. PATIENT-LVL IV: CPT | Mod: PBBFAC,,, | Performed by: INTERNAL MEDICINE

## 2019-02-05 PROCEDURE — 82746 ASSAY OF FOLIC ACID SERUM: CPT

## 2019-02-05 PROCEDURE — 85025 COMPLETE CBC W/AUTO DIFF WBC: CPT

## 2019-02-05 PROCEDURE — 84550 ASSAY OF BLOOD/URIC ACID: CPT

## 2019-02-05 PROCEDURE — 3079F DIAST BP 80-89 MM HG: CPT | Mod: CPTII,S$GLB,, | Performed by: INTERNAL MEDICINE

## 2019-02-05 RX ORDER — LEVOTHYROXINE SODIUM 112 UG/1
112 TABLET ORAL
Qty: 90 TABLET | Refills: 3 | Status: SHIPPED | OUTPATIENT
Start: 2019-02-05 | End: 2019-12-16 | Stop reason: SDUPTHER

## 2019-02-05 RX ORDER — ERGOCALCIFEROL 1.25 MG/1
CAPSULE ORAL
Qty: 12 CAPSULE | Refills: 3 | Status: SHIPPED | OUTPATIENT
Start: 2019-02-05 | End: 2019-12-16 | Stop reason: SDUPTHER

## 2019-02-05 NOTE — PROGRESS NOTES
Subjective:      Patient ID: Winifred Love is a 53 y.o. female.    Chief Complaint:  Hypothyroidism    53 yr old lady with hypothyroidism on thyroid hormone repletion seen in [facility] today.        History of Present Illness    53 yr old lady with hypothyroidism presumably due to Hashimoto's disease  Seen in [facility] today.  She is presently on LT4 112mcg Qd.  Her associated comorbidities are as detailed below;      Patient Active Problem List   Diagnosis    Hypothyroidism due to Hashimoto's thyroiditis    Essential hypertension    Obesity (BMI 30-39.9)    History of low potassium    B12 deficiency    History of vitamin D deficiency    Keratosis pilaris    Xerosis of skin    Polyarthralgia    Fibromyalgia    Adjustment disorder with mixed anxiety and depressed mood    Hyperlipidemia    Difficulty sleeping    Hashimoto's disease    Thyroiditis    Goiter    Abnormal thyroid blood test    Dysmetabolic syndrome    Hypercholesterolemia    Hypovitaminosis D    Postmenopausal      Patients baseline epworth score is 11. She has never had a prior sleep study.  Patient does have remote reports of snoring but none of apneic or gasping spells. She has no major problems with headaches. She has no major mid day somnolence.   She does have chronic fatigue and fibromyalgia. Patient also has concerns regarding progressive weight gain. She gained ~ 9 lbs over the last 2 years. Patients see Dr Weathers for fibromyalgia and non specific polyarthralgia and is on Gabapentin for this.  Patients father has MCTD and lupus.  Patients father has hyperthyroidism and her sister also has variable thyroid function problems.  Patient is menopausal in ~ 1 yr.  Patient has been known to be hypothyroid since 2006.   Patient stopped smoking in 2004.   Grav 5 Para 4+1 ( 4 alive).  Patients screening thyroid USs from 05/18 was essentially unremarkable with no nodules seen.  Her screening DEXA from 05/18  Was normal and thus her next  "ffup DEXA should be for ~ 05/20.  Patient has been on of LT4 since November 2018. Patient has apparently stopped taking all her medications since November except her iron medications. Patient has apparently been taking her fathers prednisone (her father has prednisone which he takes for mixed connective tissue disease and Lupus). She is presently on prednisone 10mg QOD as a self medication.  Patients rheumatologist is Dr Staples. She feels that the gabapentin she has been prescribed previously for fibromyalgias had not helped at all and instead she feels much better on prednisone.          Review of Systems    Objective: Resp 18   Ht 5' 5" (1.651 m)   Wt 90.6 kg (199 lb 11.8 oz)   LMP 05/18/2017 (Exact Date)   BMI 33.24 kg/m²  Body surface area is 2.04 meters squared. BP (!) 147/89 (BP Location: Right arm, Patient Position: Sitting, BP Method: Large (Automatic))   Pulse 78   Temp 98 °F (36.7 °C) (Oral)   Resp 18   Ht 5' 5" (1.651 m)   Wt 90.6 kg (199 lb 11.8 oz)   LMP 05/18/2017 (Exact Date)   BMI 33.24 kg/m² neck circ; 15" waist circ; 41.75" hip circ; 48" waist to hip ratio; 0.87             Physical Exam    Lab Review:     Results for DEXTER OLSEN (MRN [id number]) as of 2/5/2019 08:14   Ref. Range 7/28/2018 08:20 10/9/2018 10:42   Sodium Latest Ref Range: 136 - 145 mmol/L 141    Potassium Latest Ref Range: 3.5 - 5.1 mmol/L 3.9    Chloride Latest Ref Range: 95 - 110 mmol/L 104    CO2 Latest Ref Range: 23 - 29 mmol/L 31 (H)    Anion Gap Latest Ref Range: 8 - 16 mmol/L 6 (L)    BUN, Bld Latest Ref Range: 6 - 20 mg/dL 13    Creatinine Latest Ref Range: 0.5 - 1.4 mg/dL 0.8    eGFR if non African American Latest Ref Range: >60 mL/min/1.73 m^2 >60.0    eGFR if African American Latest Ref Range: >60 mL/min/1.73 m^2 >60.0    Glucose Latest Ref Range: 70 - 110 mg/dL 104    Calcium Latest Ref Range: 8.7 - 10.5 mg/dL 9.8    Triglycerides Latest Ref Range: 30 - 150 mg/dL 94    Cholesterol Latest Ref Range: " 120 - 199 mg/dL 204 (H)    HDL Latest Ref Range: 40 - 75 mg/dL 59    HDL/Chol Ratio Latest Ref Range: 20.0 - 50.0 % 28.9    LDL Cholesterol Latest Ref Range: 63.0 - 159.0 mg/dL 126.2    Non-HDL Cholesterol Latest Units: mg/dL 145    Total Cholesterol/HDL Ratio Latest Ref Range: 2.0 - 5.0  3.5    TSH Latest Ref Range: 0.400 - 4.000 uIU/mL 0.271 (L) 1.159   Free T4 Latest Ref Range: 0.71 - 1.51 ng/dL 1.26        Assessment:     1. Hypothyroidism (acquired)  T4, free    T3    Thyroglobulin    TSH    CBC auto differential    Uric acid   2. Hashimoto's disease  Thyroglobulin   3. Thyroiditis  Thyroglobulin   4. Goiter     5. Dysmetabolic syndrome  Comprehensive metabolic panel    Uric acid    Hemoglobin A1c   6. Obesity (BMI 30-39.9)     7. B12 deficiency  Vitamin B12    Vitamin B12 Deficiency Panel    Folate    Folate RBC   8. Atrophic gastritis without hemorrhage     9. Postmenopausal     10. Essential hypertension  Comprehensive metabolic panel    Uric acid    Lipid panel    Microalbumin/creatinine urine ratio    Urinalysis   11. Hyperlipidemia, unspecified hyperlipidemia type  Comprehensive metabolic panel    Lipid panel   12. Adjustment disorder with mixed anxiety and depressed mood     13. Hypovitaminosis D  Vitamin D    Calcium, ionized    PTH, intact   14. Fibromyalgia  Ambulatory consult to Rheumatology        Regarding hypothyroidism; to continue Lt4 therapy as before and will obtain full TFTs and antibody profile as detailed above. To obtain screening thyroid USS to exclude any coexisiting thyroid nodular disease.  Regarding hyperlipidemia;  Will check lipid levels today and decide based on results regarding potential need for antilipidemic therapy.  Regarding depression; To continue SSRI therapy as before.  Regarding chronic fatigue and fibromyalgia; to continue ongoing ffup with Rheum service in this regard. Will obtain basic adrenal screening to exclude any significant adrenal insufficiency.  Regarding  hypovitaminosis D; to check 25OH vit d levels and based on results may need to adjust current repletion dose.  Regarding hypertension; Presently suboptimally controlled.  To continue present antihypertensive regimen and continue serial tracking of ambulatory BP trends. If BP elevation persists @ ffup visit may needle medication dose adjustment.  Regarding Obesity and dystmetabolic syndrome; to obtain basic screening labs as detailed above. Her recent weight gain is multifactorial and this was discussed in face to face counselling of patient; tobacco cessation, recent post menopausal transition, high dose neurontin and Zoloft use are all contributors. Will discuss management strategy in more detail @ ffup visit.  Regarding vit B12 deficiency; to recheck Vit B12 and folate levels and will replete based on results.        Plan:       FFup in ~ 4mths

## 2019-02-06 DIAGNOSIS — M19.90 CHRONIC INFLAMMATORY ARTHRITIS: Primary | ICD-10-CM

## 2019-02-06 LAB
THRYOGLOBULIN INTERPRETATION: ABNORMAL
THYROGLOB AB SERPL-ACNC: >500 IU/ML
THYROGLOB SERPL-MCNC: 4.3 NG/ML
VIT B12 SERPL-MCNC: 169 NG/L

## 2019-02-06 RX ORDER — PREDNISONE 5 MG/1
5 TABLET ORAL DAILY
Qty: 30 TABLET | Refills: 1 | Status: SHIPPED | OUTPATIENT
Start: 2019-02-06 | End: 2019-03-08

## 2019-02-08 LAB
FOLATE RBC-MCNC: 541 NG/ML
METHYLMALONATE SERPL-SCNC: 0.12 NMOL/ML

## 2019-03-21 DIAGNOSIS — M19.90 CHRONIC INFLAMMATORY ARTHRITIS: ICD-10-CM

## 2019-03-21 RX ORDER — PREDNISONE 5 MG/1
TABLET ORAL
Qty: 30 TABLET | Refills: 1 | OUTPATIENT
Start: 2019-03-21

## 2019-04-25 ENCOUNTER — TELEPHONE (OUTPATIENT)
Dept: ENDOCRINOLOGY | Facility: CLINIC | Age: 54
End: 2019-04-25

## 2019-04-25 NOTE — TELEPHONE ENCOUNTER
----- Message from Edith Colon sent at 4/25/2019  9:51 AM CDT -----  Type: Needs Medical Advice    Who Called:  Patient  Best Call Back Number: [phone](work)  Additional Information: Patient needs to speak with nurse or doctor concerning appointment with Rhuematology being cancelled/ patient stated she cannot get medication ordered/needs  advice/please call patient back to advise.

## 2019-04-26 ENCOUNTER — TELEPHONE (OUTPATIENT)
Dept: RHEUMATOLOGY | Facility: CLINIC | Age: 54
End: 2019-04-26

## 2019-04-26 DIAGNOSIS — M79.7 FIBROMYALGIA: ICD-10-CM

## 2019-04-26 DIAGNOSIS — M19.90 CHRONIC INFLAMMATORY ARTHRITIS: Primary | ICD-10-CM

## 2019-04-26 RX ORDER — PREDNISONE 5 MG/1
5 TABLET ORAL DAILY
Qty: 30 TABLET | Refills: 2 | Status: SHIPPED | OUTPATIENT
Start: 2019-04-26 | End: 2019-05-26

## 2019-04-26 NOTE — TELEPHONE ENCOUNTER
Scheduled Rheumatology referral. Patient placed on waiting list for sooner appt. Appt letter mailed to patient.

## 2019-04-26 NOTE — TELEPHONE ENCOUNTER
----- Message from Jacqueline Wood sent at 4/26/2019  3:30 PM CDT -----  Type:   Apoointment Request    Caller is requesting  appointment.     Name of Caller:   Self   When is the first available appointment?     Symptoms:     Best Call Back Number:  [phone] monday through Friday  till 4:30   Additional Information:  Referred by Dr Alcaraz

## 2019-05-07 DIAGNOSIS — Z12.11 COLON CANCER SCREENING: ICD-10-CM

## 2019-05-17 ENCOUNTER — TELEPHONE (OUTPATIENT)
Dept: RHEUMATOLOGY | Facility: CLINIC | Age: 54
End: 2019-05-17

## 2019-05-17 NOTE — TELEPHONE ENCOUNTER
----- Message from Cortez Gallardo sent at 5/17/2019  9:43 AM CDT -----  Contact: Patient  Type:  Sooner Apoointment Request    Caller is requesting a sooner appointment.  Caller declined first available appointment listed below.  Caller will not accept being placed on the waitlist and is requesting a message be sent to doctor.    Name of Caller:  Patient  When is the first available appointment?  Scheduled for 11/5/19  Symptoms:  Fibromyalgia and Polyarthralgia  Best Call Back Number:  [phone] till 4:30 [phone] after 4:30  Additional Information:  Patient is requesting sooner appointment because medication will run out in 2 months

## 2019-05-17 NOTE — TELEPHONE ENCOUNTER
Returned patient call regarding sooner new patient appointment. Patient had appointment scheduled for November with Dr Vaughan. Stated almost out of prednisone and PCP will not prescribe. Nurse informed can see if doctor available at main campus. Patient stated seen Dr Weathers in November last year does not want to see him again. Nurse was not able to schedule appointment. Provided patient with phone number to [facility] to schedule appointment. Patient verbalized understanding.

## 2019-07-02 ENCOUNTER — TELEPHONE (OUTPATIENT)
Dept: ENDOCRINOLOGY | Facility: CLINIC | Age: 54
End: 2019-07-02

## 2019-07-02 NOTE — TELEPHONE ENCOUNTER
----- Message from Cassidy Wood sent at 7/2/2019 10:18 AM CDT -----  Contact: pt  Calling in regards to had to reschedule appointment today due to dad having throat cancer and would like to be workedinto the schedule before December and please advise [phone]

## 2019-08-03 NOTE — PROGRESS NOTES
Subjective:     Patient ID: Winifred Love is a 54 y.o. female     Chief Complaint: No chief complaint on file.       HPI   54 yr old lady followed by Dr. Weathers. She was last seen by him on 5/28/18. She was originally referred to him for a +FRANCISCO but has Hashimoto's thyroiditis and no evidence of a CTD. She c/o diffuse pain and was dx w fibromyalgia.     She returns today. States that her gabapentin doesn't help. She was given prednisone 5 mg/d in April by endocrinologist and it helped her joint pain. Hands, knees & feet better. Works pain free.  Takes prednisone at night. But still in pain and wants to continue prednisone.   Still she insists she feels better on it despite pain. She argues that it's not only energizing but that her muscles and joints are better.  She does not believe she has fibromyalgia. She does not believe in it. She feels her sxs are all autoimmune as her 78 yo dad has MCTD and has been on prednisone 20 mg/d chronically wo adverse effects.  She is taking care of him as he had a laryngectomy and is overwhelmed by her responsibilities and pain. She feels she is depressed due only to this situation & does not want any medications for it. She was on sertraline and stopped it.  In fact she stopped many of her medications.   AM stiffness x varies--often only a few minutes. .   Denies Raynaud's, dysphagia, tight skin, alopecia, oral ulcers, sicca symptoms, pleurisy, pericarditis, photosensitivity, skin rashes, thromboses. She's had one 2nd trimester miscarriage.         Current Outpatient Medications   Medication Sig Dispense Refill    benazepril-hydrochlorthiazide (LOTENSIN HCT) 10-12.5 mg Tab Take 1 tablet by mouth once daily. 90 tablet 3    cyanocobalamin 1,000 mcg/mL injection       DOXYLAMINE SUCCINATE (UNISOM, DOXYLAMINE, ORAL) Take by mouth.      ergocalciferol (ERGOCALCIFEROL) 50,000 unit Cap Take one tablets weekly 12 capsule 3    gabapentin (NEURONTIN) 300 MG capsule Take 300 mg  by mouth. 300mg at lunch, 600mg at bedtime      levothyroxine (SYNTHROID) 112 MCG tablet Take 1 tablet (112 mcg total) by mouth before breakfast. 90 tablet 3    pravastatin (PRAVACHOL) 20 MG tablet Take 1 tablet (20 mg total) by mouth once daily. 90 tablet 3    sertraline (ZOLOFT) 50 MG tablet Take 1 tablet (50 mg total) by mouth once daily. 90 tablet 3     No current facility-administered medications for this visit.    Not on sertraline; Not on pravastin; not on Lotensin HCT;   On Vitamin D; Vitamin B12 injections monthly, prednisone 5 mg/d & levothyroxine.      Review of patient's allergies indicates:   Allergen Reactions    Antihistamines - alkylamine     Phenergan [promethazine]        Review of Systems   Constitutional: Positive for diaphoresis and fatigue. Negative for fever.   HENT: Negative.  Negative for mouth sores, sore throat, tinnitus and trouble swallowing.    Eyes: Negative.  Negative for visual disturbance.   Respiratory: Negative.  Negative for cough, choking, chest tightness and shortness of breath.    Cardiovascular: Negative for chest pain, palpitations and leg swelling.   Gastrointestinal: Negative for abdominal distention, abdominal pain, blood in stool, constipation, diarrhea, nausea and vomiting.   Endocrine: Negative.  Negative for cold intolerance and heat intolerance.   Genitourinary: Positive for menstrual problem (painful menses. ). Negative for frequency and hematuria.   Musculoskeletal: Positive for back pain. Negative for joint swelling, myalgias, neck pain and neck stiffness.   Skin: Negative.  Negative for rash.   Neurological: Negative.  Negative for dizziness, syncope, weakness, light-headedness and numbness.   Hematological: Negative for adenopathy. Does not bruise/bleed easily.   Psychiatric/Behavioral: Positive for dysphoric mood and sleep disturbance. The patient is not nervous/anxious.        Past Medical History:   Diagnosis Date    B12 deficiency 2/10/2016     "Essential hypertension 2/10/2016    Fibromyalgia 2017    History of low potassium 2/10/2016    History of vitamin D deficiency 2/10/2016    Hypothyroidism due to Hashimoto's thyroiditis 2/10/2016    Iron deficiency anemia 2/10/2016    Keratosis pilaris 2/10/2016    Obesity (BMI 30-39.9) 2/10/2016       Past Surgical History:   Procedure Laterality Date     SECTION      x3    MOLE REMOVAL      x2       Family History   Problem Relation Age of Onset    COPD Mother     Heart disease Mother     Hypertension Mother     Hyperthyroidism Father     Hypertension Father     Lupus Father     Cancer Father     Heart disease Father     Hypothyroidism Sister     Cancer Maternal Grandmother     Diabetes Paternal Grandmother    Strong family hx of thyroid disorders  Father w MCTD but chart also states that she has several cousins with SLE.  Has 4 children: 2 boys & 2 girls & also has a foster daughter.   Oldest grandson is bipolar & on a large number of meds.  Social History     Tobacco Use    Smoking status: Former Smoker     Years: 12.00    Smokeless tobacco: Never Used    Tobacco comment: quit    Substance Use Topics    Alcohol use: Yes     Alcohol/week: 0.0 oz     Comment: 2x/month    Drug use: Not on file   Sits at a desk all day.  .  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   States she is very active, but does not exercise.     Objective:     Ht 5' 4" (1.626 m)   Wt 93.4 kg (205 lb 14.6 oz)   LMP 2017 (Exact Date)   BMI 35.34 kg/m²     Physical Exam   Vitals reviewed.  Constitutional: She is oriented to person, place, and time and well-developed, well-nourished, and in no distress. No distress.   HENT:   Head: Normocephalic and atraumatic.   Mouth/Throat: Oropharynx is clear and moist. No oropharyngeal exudate.   No facial rashes  Parotids not enlarged  No oral ulcers   Eyes: Conjunctivae and EOM are normal. Pupils are equal, round, and reactive to light. Right eye " exhibits no discharge. Left eye exhibits no discharge. No scleral icterus.   Neck: Neck supple. No JVD present. No tracheal deviation present. No thyromegaly present.   Cardiovascular: Normal rate, regular rhythm, normal heart sounds and intact distal pulses.  Exam reveals no gallop and no friction rub.    No murmur heard.  Pulmonary/Chest: Effort normal and breath sounds normal. No respiratory distress. She has no wheezes. She has no rales. She exhibits no tenderness.   Abdominal: Soft. Bowel sounds are normal. She exhibits no distension and no mass. There is no splenomegaly or hepatomegaly. There is no tenderness. There is no rebound and no guarding.   Lymphadenopathy:     She has no cervical adenopathy.        Right: No inguinal adenopathy present.        Left: No inguinal adenopathy present.   Neurological: She is alert and oriented to person, place, and time. She has normal reflexes. No cranial nerve deficit. Gait normal.   Proximal and distal muscle strength 5/5.   Skin: Skin is warm and dry. No rash noted. She is not diaphoretic.     Psychiatric: Memory and affect normal.   Musculoskeletal: Normal range of motion. She exhibits tenderness (multiple tender points.). She exhibits no edema.   No synovitis anywhere.  Adequate ROM joints.               2/5/19: CBC ok; CMP ok; Vit D 20; Vit B12 169;   5/24/18: Celiac labs ok;   Prior ESRs & CRPs wnl  4/27/16: anti parietal cell ab: 1:160; C3 & C4 ok;   4/9/16: FRANCISCO 1: 160H; neg pro    4/27/16: Arthritis Survey: personally viewed: minimal mjsn knees; probable prominent bone island right fourth distal metatarsal. No focal bone erosion.   Assessment:   Positive FRANCISCO 1:160 H; neg pro   Attributed to Hashimoto's thyroiditis   Dad has MCTD   Subjective response to steroids.   Vitamin D insufficiency  Vitamin B 12 deficiency   Hashimoto's thyroiditis  Joint pain multiple sites c/w fibromyalgia/CSS  Obesity  Plan:   Reassurance.   The patient was educated on  fibromyalgia.  Symptoms/presentations, non-pharmacologic and pharmacologic treatments and their efficacies were reviewed.   Non-pharmacologic approaches were stressed.  ExPRESS was reviewed   Regular/daily exercise was especially emphasized. Strategies for success were offered.  Cognitive behavioral therapy is very helpful.  Pharmacologic treatments approved and otherwise were reviewed.  Duloxetine may be beneficial for her.   Patient was provided with written information and referred to 2 websites for further information.  Needs to get a PCP as Dr. Edmonds has left [facility].   HCQ may be considered empirically, however, I doubt there will be a significant/sustained improvement on it.  I told her I will not prescribe steroids.    Return for f/u with Dr. Weathers.      This evaluation was over 40 minutes with over 75% of the time spent on education and counseling.

## 2019-08-08 ENCOUNTER — OFFICE VISIT (OUTPATIENT)
Dept: RHEUMATOLOGY | Facility: CLINIC | Age: 54
End: 2019-08-08
Payer: COMMERCIAL

## 2019-08-08 VITALS
SYSTOLIC BLOOD PRESSURE: 173 MMHG | DIASTOLIC BLOOD PRESSURE: 96 MMHG | HEART RATE: 75 BPM | BODY MASS INDEX: 35.16 KG/M2 | WEIGHT: 205.94 LBS | HEIGHT: 64 IN

## 2019-08-08 DIAGNOSIS — E66.9 OBESITY (BMI 30-39.9): ICD-10-CM

## 2019-08-08 DIAGNOSIS — R76.8 POSITIVE ANA (ANTINUCLEAR ANTIBODY): Primary | ICD-10-CM

## 2019-08-08 DIAGNOSIS — M79.7 FIBROMYALGIA: ICD-10-CM

## 2019-08-08 DIAGNOSIS — E55.9 VITAMIN D INSUFFICIENCY: ICD-10-CM

## 2019-08-08 DIAGNOSIS — Z55.9 EDUCATIONAL CIRCUMSTANCE: ICD-10-CM

## 2019-08-08 PROCEDURE — 99215 OFFICE O/P EST HI 40 MIN: CPT | Mod: S$GLB,,, | Performed by: INTERNAL MEDICINE

## 2019-08-08 PROCEDURE — 99999 PR PBB SHADOW E&M-EST. PATIENT-LVL III: CPT | Mod: PBBFAC,,, | Performed by: INTERNAL MEDICINE

## 2019-08-08 PROCEDURE — 99999 PR PBB SHADOW E&M-EST. PATIENT-LVL III: ICD-10-PCS | Mod: PBBFAC,,, | Performed by: INTERNAL MEDICINE

## 2019-08-08 PROCEDURE — 3080F DIAST BP >= 90 MM HG: CPT | Mod: CPTII,S$GLB,, | Performed by: INTERNAL MEDICINE

## 2019-08-08 PROCEDURE — 3008F BODY MASS INDEX DOCD: CPT | Mod: CPTII,S$GLB,, | Performed by: INTERNAL MEDICINE

## 2019-08-08 PROCEDURE — 99215 PR OFFICE/OUTPT VISIT, EST, LEVL V, 40-54 MIN: ICD-10-PCS | Mod: S$GLB,,, | Performed by: INTERNAL MEDICINE

## 2019-08-08 PROCEDURE — 3077F SYST BP >= 140 MM HG: CPT | Mod: CPTII,S$GLB,, | Performed by: INTERNAL MEDICINE

## 2019-08-08 PROCEDURE — 3008F PR BODY MASS INDEX (BMI) DOCUMENTED: ICD-10-PCS | Mod: CPTII,S$GLB,, | Performed by: INTERNAL MEDICINE

## 2019-08-08 PROCEDURE — 3080F PR MOST RECENT DIASTOLIC BLOOD PRESSURE >= 90 MM HG: ICD-10-PCS | Mod: CPTII,S$GLB,, | Performed by: INTERNAL MEDICINE

## 2019-08-08 PROCEDURE — 3077F PR MOST RECENT SYSTOLIC BLOOD PRESSURE >= 140 MM HG: ICD-10-PCS | Mod: CPTII,S$GLB,, | Performed by: INTERNAL MEDICINE

## 2019-08-08 RX ORDER — PREDNISONE 5 MG/1
5 TABLET ORAL DAILY
COMMUNITY
End: 2019-12-16 | Stop reason: SDUPTHER

## 2019-08-08 SDOH — SOCIAL DETERMINANTS OF HEALTH (SDOH): PROBLEMS RELATED TO EDUCATION AND LITERACY, UNSPECIFIED: Z55.9

## 2019-08-08 ASSESSMENT — ROUTINE ASSESSMENT OF PATIENT INDEX DATA (RAPID3)
AM STIFFNESS SCORE: 1, YES
MDHAQ FUNCTION SCORE: 1.1
FATIGUE SCORE: 10
PAIN SCORE: 4.5
PSYCHOLOGICAL DISTRESS SCORE: 3
TOTAL RAPID3 SCORE: 4.72
PATIENT GLOBAL ASSESSMENT SCORE: 6

## 2019-08-08 NOTE — PATIENT INSTRUCTIONS
Read on fibromyalgia.    Duloxetine/cymbalta may be a good drug for you.       [email or web address]     [email or web address]

## 2019-12-16 ENCOUNTER — OFFICE VISIT (OUTPATIENT)
Dept: ENDOCRINOLOGY | Facility: CLINIC | Age: 54
End: 2019-12-16
Payer: COMMERCIAL

## 2019-12-16 ENCOUNTER — TELEPHONE (OUTPATIENT)
Dept: ENDOCRINOLOGY | Facility: CLINIC | Age: 54
End: 2019-12-16

## 2019-12-16 VITALS
RESPIRATION RATE: 98 BRPM | BODY MASS INDEX: 35.46 KG/M2 | DIASTOLIC BLOOD PRESSURE: 90 MMHG | HEIGHT: 64 IN | WEIGHT: 207.69 LBS | SYSTOLIC BLOOD PRESSURE: 150 MMHG | TEMPERATURE: 98 F | HEART RATE: 95 BPM

## 2019-12-16 DIAGNOSIS — E53.8 B12 DEFICIENCY: ICD-10-CM

## 2019-12-16 DIAGNOSIS — E06.3 HASHIMOTO'S DISEASE: Primary | ICD-10-CM

## 2019-12-16 DIAGNOSIS — E55.9 HYPOVITAMINOSIS D: ICD-10-CM

## 2019-12-16 DIAGNOSIS — M79.7 FIBROMYALGIA: ICD-10-CM

## 2019-12-16 DIAGNOSIS — F43.23 ADJUSTMENT DISORDER WITH MIXED ANXIETY AND DEPRESSED MOOD: ICD-10-CM

## 2019-12-16 DIAGNOSIS — E88.810 DYSMETABOLIC SYNDROME: ICD-10-CM

## 2019-12-16 DIAGNOSIS — E04.9 GOITER: ICD-10-CM

## 2019-12-16 DIAGNOSIS — E03.9 HYPOTHYROIDISM (ACQUIRED): ICD-10-CM

## 2019-12-16 DIAGNOSIS — E66.9 OBESITY (BMI 30-39.9): ICD-10-CM

## 2019-12-16 DIAGNOSIS — Z78.0 POSTMENOPAUSAL: ICD-10-CM

## 2019-12-16 DIAGNOSIS — E78.5 HYPERLIPIDEMIA, UNSPECIFIED HYPERLIPIDEMIA TYPE: ICD-10-CM

## 2019-12-16 DIAGNOSIS — E78.00 HYPERCHOLESTEROLEMIA: ICD-10-CM

## 2019-12-16 DIAGNOSIS — L85.3 XEROSIS OF SKIN: ICD-10-CM

## 2019-12-16 DIAGNOSIS — I10 ESSENTIAL HYPERTENSION: ICD-10-CM

## 2019-12-16 DIAGNOSIS — E06.3 HYPOTHYROIDISM DUE TO HASHIMOTO'S THYROIDITIS: ICD-10-CM

## 2019-12-16 DIAGNOSIS — E06.9 THYROIDITIS: ICD-10-CM

## 2019-12-16 DIAGNOSIS — E03.8 HYPOTHYROIDISM DUE TO HASHIMOTO'S THYROIDITIS: ICD-10-CM

## 2019-12-16 DIAGNOSIS — R73.9 HYPERGLYCEMIA: ICD-10-CM

## 2019-12-16 DIAGNOSIS — Z86.39 HISTORY OF VITAMIN D DEFICIENCY: ICD-10-CM

## 2019-12-16 DIAGNOSIS — K29.40 ATROPHIC GASTRITIS WITHOUT HEMORRHAGE: ICD-10-CM

## 2019-12-16 DIAGNOSIS — Z79.52 CURRENT CHRONIC USE OF SYSTEMIC STEROIDS: ICD-10-CM

## 2019-12-16 PROCEDURE — 99999 PR PBB SHADOW E&M-EST. PATIENT-LVL III: CPT | Mod: PBBFAC,,, | Performed by: INTERNAL MEDICINE

## 2019-12-16 PROCEDURE — 3077F SYST BP >= 140 MM HG: CPT | Mod: CPTII,S$GLB,, | Performed by: INTERNAL MEDICINE

## 2019-12-16 PROCEDURE — 3077F PR MOST RECENT SYSTOLIC BLOOD PRESSURE >= 140 MM HG: ICD-10-PCS | Mod: CPTII,S$GLB,, | Performed by: INTERNAL MEDICINE

## 2019-12-16 PROCEDURE — 3008F BODY MASS INDEX DOCD: CPT | Mod: CPTII,S$GLB,, | Performed by: INTERNAL MEDICINE

## 2019-12-16 PROCEDURE — 3080F PR MOST RECENT DIASTOLIC BLOOD PRESSURE >= 90 MM HG: ICD-10-PCS | Mod: CPTII,S$GLB,, | Performed by: INTERNAL MEDICINE

## 2019-12-16 PROCEDURE — 99999 PR PBB SHADOW E&M-EST. PATIENT-LVL III: ICD-10-PCS | Mod: PBBFAC,,, | Performed by: INTERNAL MEDICINE

## 2019-12-16 PROCEDURE — 3080F DIAST BP >= 90 MM HG: CPT | Mod: CPTII,S$GLB,, | Performed by: INTERNAL MEDICINE

## 2019-12-16 PROCEDURE — 99214 OFFICE O/P EST MOD 30 MIN: CPT | Mod: S$GLB,,, | Performed by: INTERNAL MEDICINE

## 2019-12-16 PROCEDURE — 99214 PR OFFICE/OUTPT VISIT, EST, LEVL IV, 30-39 MIN: ICD-10-PCS | Mod: S$GLB,,, | Performed by: INTERNAL MEDICINE

## 2019-12-16 PROCEDURE — 3008F PR BODY MASS INDEX (BMI) DOCUMENTED: ICD-10-PCS | Mod: CPTII,S$GLB,, | Performed by: INTERNAL MEDICINE

## 2019-12-16 RX ORDER — PREDNISONE 5 MG/1
5 TABLET ORAL DAILY
Qty: 90 TABLET | Refills: 3 | Status: SHIPPED | OUTPATIENT
Start: 2019-12-16 | End: 2020-03-15

## 2019-12-16 RX ORDER — ERGOCALCIFEROL 1.25 MG/1
CAPSULE ORAL
Qty: 12 CAPSULE | Refills: 3 | Status: SHIPPED | OUTPATIENT
Start: 2019-12-16 | End: 2020-10-27 | Stop reason: SDUPTHER

## 2019-12-16 RX ORDER — BENAZEPRIL HYDROCHLORIDE AND HYDROCHLOROTHIAZIDE 10; 12.5 MG/1; MG/1
1 TABLET ORAL DAILY
Qty: 90 TABLET | Refills: 3 | Status: SHIPPED | OUTPATIENT
Start: 2019-12-16 | End: 2020-03-06

## 2019-12-16 RX ORDER — PRAVASTATIN SODIUM 20 MG/1
20 TABLET ORAL DAILY
Qty: 90 TABLET | Refills: 3 | Status: SHIPPED | OUTPATIENT
Start: 2019-12-16 | End: 2019-12-18

## 2019-12-16 RX ORDER — GABAPENTIN 300 MG/1
300 CAPSULE ORAL 2 TIMES DAILY
Qty: 180 CAPSULE | Refills: 3 | Status: SHIPPED | OUTPATIENT
Start: 2019-12-16 | End: 2020-03-15

## 2019-12-16 RX ORDER — LEVOTHYROXINE SODIUM 112 UG/1
112 TABLET ORAL
Qty: 90 TABLET | Refills: 3 | Status: SHIPPED | OUTPATIENT
Start: 2019-12-16 | End: 2020-10-29

## 2019-12-16 NOTE — PROGRESS NOTES
Subjective:      Patient ID: Winifred Love is a 54 y.o. female.    Chief Complaint:  Hypothyroidism    54 yr old postmenopausal lady with hypothyroidism on thyroid hormone repletion seen in [facility] today.    History of Present Illness    54 yr old postmenopausal lady with hypothyroidism presumably due to Hashimoto's disease  Seen in [facility] today.  She is presently on LT4 112mcg Qd. Her screening thyroid USS from 05/18 showed no thyroid nodules.  Her associated comorbidities are as detailed below;        Patient Active Problem List   Diagnosis    Hypothyroidism due to Hashimoto's thyroiditis    Essential hypertension    Obesity (BMI 30-39.9)    History of low potassium    B12 deficiency    History of vitamin D deficiency    Keratosis pilaris    Xerosis of skin    Polyarthralgia    Fibromyalgia    Adjustment disorder with mixed anxiety and depressed mood    Hyperlipidemia    Difficulty sleeping    Hashimoto's disease    Thyroiditis    Goiter    Abnormal thyroid blood test    Dysmetabolic syndrome    Hypercholesterolemia    Hypovitaminosis D    Postmenopausal      Patients baseline epworth score is 11. She has never had a prior sleep study.  Patient does have remote reports of snoring but none of apneic or gasping spells. She has no major problems with headaches. She has no major mid day somnolence.   She does have chronic fatigue and fibromyalgia. Patient also has concerns regarding progressive weight gain. She gained ~ 9 lbs over the last 2 years. Patients see Dr Weathers for fibromyalgia and non specific polyarthralgia and is on Gabapentin for this.  Patients father has MCTD and lupus.  Patients father has hyperthyroidism and her sister also has variable thyroid function problems.  Patient is menopausal in ~ 1 yr.  Patient has been known to be hypothyroid since 2006.   Patient stopped smoking in 2004.   Grav 5 Para 4+1 ( 4 alive).  Patients screening thyroid USs from 05/18 was essentially  unremarkable with no nodules seen.  Her screening DEXA from 05/18  Was normal and thus her next ffup DEXA should be for ~ 05/20.  Patient has been on of LT4 since November 2018. Patient has apparently stopped taking all her medications since November except her iron medications. Patient has apparently been taking her fathers prednisone (her father has prednisone which he takes for mixed connective tissue disease and Lupus). She is presently on prednisone 5mg QD as a self medication.  Patients rheumatologist is Dr Staples. She feels that the gabapentin she has been prescribed previously for fibromyalgias had not helped at all and instead she feels much better on prednisone.  Patient has not been seen for some time and has been struggling with her father health.  She has been of synthroid for several months and has been of virtually all her other medications.      Review of Systems   Constitutional: Positive for fatigue (chronic but stable). Negative for diaphoresis.   HENT: Negative for facial swelling, hearing loss, trouble swallowing and voice change.    Eyes: Negative for photophobia and visual disturbance.   Respiratory: Negative for cough and shortness of breath.    Cardiovascular: Negative for chest pain, palpitations and leg swelling.   Gastrointestinal: Negative for abdominal distention, abdominal pain, nausea and vomiting.   Genitourinary: Negative for menstrual problem (postmenopausal).   Musculoskeletal: Positive for back pain (chronic). Negative for arthralgias and gait problem.   Skin: Negative for color change, pallor and rash.   Neurological: Negative for numbness and headaches.   Hematological: Does not bruise/bleed easily.   Psychiatric/Behavioral: Negative for confusion. The patient is not nervous/anxious and is not hyperactive.        Objective: BP (!) 150/90 (BP Location: Right arm, Patient Position: Sitting, BP Method: Medium (Manual))   Pulse 95   Temp 98.3 °F (36.8 °C) (Oral)   Resp (!) 98    "Ht 5' 4" (1.626 m)   Wt 94.2 kg (207 lb 10.8 oz)   LMP 05/18/2017 (Exact Date)   BMI 35.65 kg/m²  Body surface area is 2.06 meters squared.         Physical Exam   Constitutional: She is oriented to person, place, and time. She appears well-developed and well-nourished. No distress.   Pleasant middle aged lady. Obese. Looks tired. Not pale, anicteric and afebrile. Well hydrated. Not in chronic ill health. No moon facies, no excess facial acne, skin tags nor nuchal fat pad.   HENT:   Head: Normocephalic and atraumatic.   Eyes: Pupils are equal, round, and reactive to light. Conjunctivae and EOM are normal. No scleral icterus.   Neck: Normal range of motion. Neck supple. No JVD present. No tracheal deviation present. No thyromegaly present.   Cardiovascular: Normal rate, regular rhythm and normal heart sounds.   No murmur heard.  Pulmonary/Chest: Effort normal and breath sounds normal. No respiratory distress.   Abdominal: She exhibits no distension. There is no tenderness.   Obese anterior abdominal wall   Musculoskeletal: Normal range of motion. She exhibits no edema or deformity.   Neurological: She is alert and oriented to person, place, and time. No cranial nerve deficit.   Skin: Skin is warm and dry. She is not diaphoretic. No erythema. No pallor.   Psychiatric: She has a normal mood and affect. Her behavior is normal. Judgment and thought content normal.   Vitals reviewed.      Lab Review:     Results for DEXTER OLSEN (MRN [id number]) as of 12/16/2019 16:19   Ref. Range 2/5/2019 09:00 2/5/2019 09:08 2/5/2019 09:08   WBC Latest Ref Range: 3.90 - 12.70 K/uL  9.01    RBC Latest Ref Range: 4.00 - 5.40 M/uL  4.88    Hemoglobin Latest Ref Range: 12.0 - 16.0 g/dL  13.8    Hematocrit Latest Ref Range: 37.0 - 48.5 %  44.3    MCV Latest Ref Range: 82 - 98 fL  91    MCH Latest Ref Range: 27.0 - 31.0 pg  28.3    MCHC Latest Ref Range: 32.0 - 36.0 g/dL  31.2 (L)    RDW Latest Ref Range: 11.5 - 14.5 %  13.2  "   Platelets Latest Ref Range: 150 - 350 K/uL  204    MPV Latest Ref Range: 9.2 - 12.9 fL  10.9    Gran% Latest Ref Range: 38.0 - 73.0 %  77.0 (H)    Gran # (ANC) Latest Ref Range: 1.8 - 7.7 K/uL  6.9    Lymph% Latest Ref Range: 18.0 - 48.0 %  15.1 (L)    Lymph # Latest Ref Range: 1.0 - 4.8 K/uL  1.4    Mono% Latest Ref Range: 4.0 - 15.0 %  6.9    Mono # Latest Ref Range: 0.3 - 1.0 K/uL  0.6    Eosinophil% Latest Ref Range: 0.0 - 8.0 %  0.3    Eos # Latest Ref Range: 0.0 - 0.5 K/uL  0.0    Basophil% Latest Ref Range: 0.0 - 1.9 %  0.4    Baso # Latest Ref Range: 0.00 - 0.20 K/uL  0.04    nRBC Latest Ref Range: 0 /100 WBC  0    Differential Method Unknown  Automated    Immature Grans (Abs) Latest Ref Range: 0.00 - 0.04 K/uL  0.03    Immature Granulocytes Latest Ref Range: 0.0 - 0.5 %  0.3    Folate Latest Ref Range: 4.0 - 24.0 ng/mL  7.3    Vitamin B-12 Latest Ref Range: 180 - 914 ng/L  264 169 (L)   Sodium Latest Ref Range: 136 - 145 mmol/L  142    Potassium Latest Ref Range: 3.5 - 5.1 mmol/L  3.5    Chloride Latest Ref Range: 95 - 110 mmol/L  105    CO2 Latest Ref Range: 23 - 29 mmol/L  31 (H)    Anion Gap Latest Ref Range: 8 - 16 mmol/L  6 (L)    BUN, Bld Latest Ref Range: 6 - 20 mg/dL  14    Creatinine Latest Ref Range: 0.5 - 1.4 mg/dL  0.8    eGFR if non African American Latest Ref Range: >60 mL/min/1.73 m^2  >60.0    eGFR if African American Latest Ref Range: >60 mL/min/1.73 m^2  >60.0    Glucose Latest Ref Range: 70 - 110 mg/dL  89    Calcium Latest Ref Range: 8.7 - 10.5 mg/dL  9.8    Calcium, Ion Latest Ref Range: 1.06 - 1.42 mmol/L  1.23    Alkaline Phosphatase Latest Ref Range: 55 - 135 U/L  88    PROTEIN TOTAL Latest Ref Range: 6.0 - 8.4 g/dL  7.3    Albumin Latest Ref Range: 3.5 - 5.2 g/dL  3.7    Uric Acid Latest Ref Range: 2.4 - 5.7 mg/dL  4.7    BILIRUBIN TOTAL Latest Ref Range: 0.1 - 1.0 mg/dL  0.4    AST Latest Ref Range: 10 - 40 U/L  16    ALT Latest Ref Range: 10 - 44 U/L  12    Triglycerides Latest  Ref Range: 30 - 150 mg/dL  78    Cholesterol Latest Ref Range: 120 - 199 mg/dL  248 (H)    HDL Latest Ref Range: 40 - 75 mg/dL  72    Hdl/Cholesterol Ratio Latest Ref Range: 20.0 - 50.0 %  29.0    LDL Cholesterol External Latest Ref Range: 63.0 - 159.0 mg/dL  160.4 (H)    Non-HDL Cholesterol Latest Units: mg/dL  176    Total Cholesterol/HDL Ratio Latest Ref Range: 2.0 - 5.0   3.4    RBC Folate Latest Ref Range: 280 - 791 ng/mL  541    B12 Def. Methylmalonic Acid Latest Ref Range: <=0.40 nmol/mL  0.12    Vit D, 25-Hydroxy Latest Ref Range: 30 - 96 ng/mL  20 (L)    Hemoglobin A1C External Latest Ref Range: 4.0 - 5.6 %  5.6    Estimated Avg Glucose Latest Ref Range: 68 - 131 mg/dL  114    TSH Latest Ref Range: 0.400 - 4.000 uIU/mL  8.377 (H)    T3, Total Latest Ref Range: 60 - 180 ng/dL  76    Free T4 Latest Ref Range: 0.71 - 1.51 ng/dL  0.79    Thyroglobulin Interpretation Unknown  SEE BELOW    Thyroglobulin Antibody Screen Latest Ref Range: <4.0 IU/mL  >500 (H)    Thyroglobulin, Tumor Marker Latest Units: ng/mL  4.3 (H)    PTH Latest Ref Range: 9.0 - 77.0 pg/mL  68.0    Specimen UA Unknown Urine, Unspecified     Color, UA Latest Ref Range: Yellow, Straw, Ene  Yellow     Appearance, UA Latest Ref Range: Clear  Clear     Specific [place], UA Latest Ref Range: 1.005 - 1.030  1.025     pH, UA Latest Ref Range: 5.0 - 8.0  5.0     Protein, UA Latest Ref Range: Negative  Negative     Glucose, UA Latest Ref Range: Negative  Negative     Ketones, UA Latest Ref Range: Negative  Negative     Occult Blood UA Latest Ref Range: Negative  Negative     NITRITE UA Latest Ref Range: Negative  Negative     Bilirubin (UA) Latest Ref Range: Negative  Negative     Leukocytes, UA Latest Ref Range: Negative  Trace (A)     RBC, UA Latest Ref Range: 0 - 4 /hpf 1     WBC, UA Latest Ref Range: 0 - 5 /hpf 7 (H)     Bacteria, UA Latest Ref Range: None-Occ /hpf Few (A)     Squam Epithel, UA Latest Units: /hpf 1     Microscopic Comment Unknown  SEE COMMENT     Microalbum.,U,Random Latest Units: ug/mL 17.0     Creatinine, Random Ur Latest Ref Range: 15.0 - 325.0 mg/dL 191.0     MICROALB/CREAT RATIO Latest Ref Range: 0.0 - 30.0 ug/mg 8.9         Assessment:     1. Hashimoto's disease  T4, free    T3    Thyroglobulin    TSH   2. Hypothyroidism (acquired)  T4, free    T3    Thyroglobulin    TSH   3. Thyroiditis     4. Dysmetabolic syndrome  Lipid panel   5. Goiter     6. Atrophic gastritis without hemorrhage     7. B12 deficiency  Vitamin B12 Deficiency Panel    Vitamin B12   8. History of vitamin D deficiency  Vitamin D    PTH, intact    Calcium, ionized   9. Obesity (BMI 30-39.9)     10. Fibromyalgia     11. Hyperlipidemia, unspecified hyperlipidemia type     12. Essential hypertension  Lipid panel   13. Postmenopausal     14. Xerosis of skin     15. Adjustment disorder with mixed anxiety and depressed mood     16. Hyperglycemia  Hemoglobin A1c   17. Current chronic use of systemic steroids  Hemoglobin A1c        Regarding hypothyroidism; to continue Lt4 therapy as before and will obtain full TFTs and antibody profile as detailed above.  To restart her synthroid 112mcg QD immediately.  Regarding hyperlipidemia;  Will check lipid levels today and decide based on results regarding potential need for antilipidemic therapy.  Regarding depression; To continue SSRI therapy as before.  Regarding chronic fatigue and fibromyalgia;Lo is yet to established a relationship with a rheumatologist she is comfortable with. Will obtain basic adrenal screening to exclude any significant adrenal insufficiency. For now to continue low dose prednisone 5mg Qd as before. Advised regarding the potential risks of long term chronic steroid use.  Regarding hypovitaminosis D; to check 25OH vit d levels and based on results may need to adjust current repletion dose.  Regarding hypertension; Presently suboptimally controlled.  To continue present antihypertensive regimen and continue  serial tracking of ambulatory BP trends. If BP elevation persists @ ffup visit may needle medication dose adjustment.  Regarding Obesity and dystmetabolic syndrome; to obtain basic screening labs as detailed above. Her recent weight gain is multifactorial and this was discussed in face to face counselling of patient; tobacco cessation, recent post menopausal transition, high dose neurontin and Zoloft use are all contributors. Will discuss management strategy in more detail @ ffup visit.  Regarding vit B12 deficiency; to recheck Vit B12 and folate levels and will replete based on results.    Plan:       FFup  In ~ 4mths

## 2019-12-18 ENCOUNTER — LAB VISIT (OUTPATIENT)
Dept: LAB | Facility: HOSPITAL | Age: 54
End: 2019-12-18
Attending: INTERNAL MEDICINE
Payer: COMMERCIAL

## 2019-12-18 DIAGNOSIS — Z79.52 CURRENT CHRONIC USE OF SYSTEMIC STEROIDS: ICD-10-CM

## 2019-12-18 DIAGNOSIS — E78.5 HYPERLIPIDEMIA, UNSPECIFIED HYPERLIPIDEMIA TYPE: ICD-10-CM

## 2019-12-18 DIAGNOSIS — E53.8 B12 DEFICIENCY: ICD-10-CM

## 2019-12-18 DIAGNOSIS — E06.3 HASHIMOTO'S DISEASE: ICD-10-CM

## 2019-12-18 DIAGNOSIS — E88.810 DYSMETABOLIC SYNDROME: ICD-10-CM

## 2019-12-18 DIAGNOSIS — E78.00 HYPERCHOLESTEROLEMIA: ICD-10-CM

## 2019-12-18 DIAGNOSIS — R73.9 HYPERGLYCEMIA: ICD-10-CM

## 2019-12-18 DIAGNOSIS — E03.9 HYPOTHYROIDISM (ACQUIRED): Primary | ICD-10-CM

## 2019-12-18 DIAGNOSIS — E03.9 HYPOTHYROIDISM (ACQUIRED): ICD-10-CM

## 2019-12-18 DIAGNOSIS — I10 ESSENTIAL HYPERTENSION: ICD-10-CM

## 2019-12-18 DIAGNOSIS — Z86.39 HISTORY OF VITAMIN D DEFICIENCY: ICD-10-CM

## 2019-12-18 LAB
25(OH)D3+25(OH)D2 SERPL-MCNC: 19 NG/ML (ref 30–96)
CA-I BLDV-SCNC: 1.18 MMOL/L (ref 1.06–1.42)
CHOLEST SERPL-MCNC: 278 MG/DL (ref 120–199)
CHOLEST/HDLC SERPL: 4.2 {RATIO} (ref 2–5)
CORTIS SERPL-MCNC: 13.5 UG/DL
DHEA-S SERPL-MCNC: 34.2 UG/DL (ref 29.7–182.2)
ESTIMATED AVG GLUCOSE: 114 MG/DL (ref 68–131)
HBA1C MFR BLD HPLC: 5.6 % (ref 4–5.6)
HDLC SERPL-MCNC: 66 MG/DL (ref 40–75)
HDLC SERPL: 23.7 % (ref 20–50)
LDLC SERPL CALC-MCNC: 193.4 MG/DL (ref 63–159)
NONHDLC SERPL-MCNC: 212 MG/DL
PTH-INTACT SERPL-MCNC: 103.3 PG/ML (ref 9–77)
T3 SERPL-MCNC: 103 NG/DL (ref 60–180)
T4 FREE SERPL-MCNC: 0.66 NG/DL (ref 0.71–1.51)
TRIGL SERPL-MCNC: 93 MG/DL (ref 30–150)
TSH SERPL DL<=0.005 MIU/L-ACNC: 47.52 UIU/ML (ref 0.4–4)
VIT B12 SERPL-MCNC: 219 PG/ML (ref 210–950)

## 2019-12-18 PROCEDURE — 84432 ASSAY OF THYROGLOBULIN: CPT

## 2019-12-18 PROCEDURE — 84443 ASSAY THYROID STIM HORMONE: CPT

## 2019-12-18 PROCEDURE — 82024 ASSAY OF ACTH: CPT

## 2019-12-18 PROCEDURE — 82330 ASSAY OF CALCIUM: CPT

## 2019-12-18 PROCEDURE — 36415 COLL VENOUS BLD VENIPUNCTURE: CPT

## 2019-12-18 PROCEDURE — 83036 HEMOGLOBIN GLYCOSYLATED A1C: CPT

## 2019-12-18 PROCEDURE — 82533 TOTAL CORTISOL: CPT

## 2019-12-18 PROCEDURE — 82306 VITAMIN D 25 HYDROXY: CPT

## 2019-12-18 PROCEDURE — 84480 ASSAY TRIIODOTHYRONINE (T3): CPT

## 2019-12-18 PROCEDURE — 83921 ORGANIC ACID SINGLE QUANT: CPT

## 2019-12-18 PROCEDURE — 82607 VITAMIN B-12: CPT | Mod: 91

## 2019-12-18 PROCEDURE — 83970 ASSAY OF PARATHORMONE: CPT

## 2019-12-18 PROCEDURE — 84439 ASSAY OF FREE THYROXINE: CPT

## 2019-12-18 PROCEDURE — 82607 VITAMIN B-12: CPT

## 2019-12-18 PROCEDURE — 80061 LIPID PANEL: CPT

## 2019-12-18 PROCEDURE — 82088 ASSAY OF ALDOSTERONE: CPT

## 2019-12-18 PROCEDURE — 82627 DEHYDROEPIANDROSTERONE: CPT

## 2019-12-18 PROCEDURE — 82626 DEHYDROEPIANDROSTERONE: CPT

## 2019-12-18 RX ORDER — PRAVASTATIN SODIUM 40 MG/1
40 TABLET ORAL DAILY
Qty: 90 TABLET | Refills: 3 | Status: SHIPPED | OUTPATIENT
Start: 2019-12-18 | End: 2021-11-27 | Stop reason: SDUPTHER

## 2019-12-19 LAB
ALDOST SERPL-MCNC: 18 NG/DL
THRYOGLOBULIN INTERPRETATION: ABNORMAL
THYROGLOB AB SERPL-ACNC: >500 IU/ML
THYROGLOB SERPL-MCNC: 8.9 NG/ML
VIT B12 SERPL-MCNC: 185 NG/L (ref 180–914)

## 2019-12-20 LAB
ACTH PLAS-MCNC: 53 PG/ML (ref 0–46)
METHYLMALONATE SERPL-SCNC: 0.11 NMOL/ML

## 2019-12-22 LAB — DHEA SERPL-MCNC: 1.48 NG/ML (ref 0.63–4.7)

## 2020-03-06 DIAGNOSIS — I10 ESSENTIAL HYPERTENSION: ICD-10-CM

## 2020-03-06 RX ORDER — BENAZEPRIL HYDROCHLORIDE AND HYDROCHLOROTHIAZIDE 10; 12.5 MG/1; MG/1
TABLET ORAL
Qty: 90 TABLET | Refills: 3 | Status: SHIPPED | OUTPATIENT
Start: 2020-03-06 | End: 2020-10-29 | Stop reason: SDUPTHER

## 2020-04-08 ENCOUNTER — NURSE TRIAGE (OUTPATIENT)
Dept: ADMINISTRATIVE | Facility: CLINIC | Age: 55
End: 2020-04-08

## 2020-04-08 NOTE — TELEPHONE ENCOUNTER
Daughter calling with regards to 55 year old patient. States she has been experiencing 5/10 cramping right sided abdominal pain since last night. Had previous episode accompanied by diarrhea on 3/30 when entire family had viral gastroenteritis. Currently denies any diarrhea, nausea, vomiting, fever, chest pain, SOB. Patient has history of immunocompromise and is declining office visit, will refer to [facility].     Reason for Disposition   MODERATE OR MILD pain that comes and goes (cramps) lasts > 24 hours    Additional Information   Negative: Passed out (i.e., fainted, collapsed and was not responding)   Negative: Shock suspected (e.g., cold/pale/clammy skin, too weak to stand, low BP, rapid pulse)   Negative: Sounds like a life-threatening emergency to the triager   Negative: Chest pain   Negative: Pain is mainly in upper abdomen (if needed ask: 'is it mainly above the belly button?')   Negative: Abdominal pain and pregnant > 20 weeks   Negative: Abdominal pain and pregnant < 20 weeks   Negative: SEVERE abdominal pain (e.g., excruciating)   Negative: Vomiting red blood or black (coffee ground) material   Negative: Bloody, black, or tarry bowel movements   Negative: Constant abdominal pain lasting > 2 hours   Negative: Vomiting bile (green color)   Negative: Patient sounds very sick or weak to the triager   Negative: Vomiting and abdomen looks much more swollen than usual   Negative: White of the eyes have turned yellow (i.e., jaundice)   Negative: Blood in urine (red, pink, or tea-colored)   Negative: Fever > 103 F (39.4 C)   Negative: Fever > 101 F (38.3 C) and over 60 years of age   Negative: Fever > 100.0 F (37.8 C) and has diabetes mellitus or a weak immune system (e.g., HIV positive, cancer chemotherapy, organ transplant, splenectomy, chronic steroids)   Negative: Fever > 100.0 F (37.8 C) and bedridden (e.g., nursing home patient, stroke, chronic illness, recovering from surgery)    Negative: Pregnant or could be pregnant (i.e., missed last menstrual period)   Negative: Unusual vaginal discharge   Negative: Age > 60 years    Protocols used: ABDOMINAL PAIN - FEMALE-A-[place]

## 2020-05-29 ENCOUNTER — OFFICE VISIT (OUTPATIENT)
Dept: ENDOCRINOLOGY | Facility: CLINIC | Age: 55
End: 2020-05-29
Payer: COMMERCIAL

## 2020-05-29 VITALS
HEIGHT: 64 IN | HEART RATE: 80 BPM | SYSTOLIC BLOOD PRESSURE: 170 MMHG | TEMPERATURE: 98 F | WEIGHT: 202.69 LBS | DIASTOLIC BLOOD PRESSURE: 92 MMHG | BODY MASS INDEX: 34.6 KG/M2

## 2020-05-29 DIAGNOSIS — Z78.0 POSTMENOPAUSAL: ICD-10-CM

## 2020-05-29 DIAGNOSIS — E53.8 B12 DEFICIENCY: ICD-10-CM

## 2020-05-29 DIAGNOSIS — Z86.39 HISTORY OF VITAMIN D DEFICIENCY: ICD-10-CM

## 2020-05-29 DIAGNOSIS — L85.3 XEROSIS OF SKIN: ICD-10-CM

## 2020-05-29 DIAGNOSIS — F43.23 ADJUSTMENT DISORDER WITH MIXED ANXIETY AND DEPRESSED MOOD: ICD-10-CM

## 2020-05-29 DIAGNOSIS — E04.9 GOITER: ICD-10-CM

## 2020-05-29 DIAGNOSIS — I10 ESSENTIAL HYPERTENSION: ICD-10-CM

## 2020-05-29 DIAGNOSIS — E55.9 HYPOVITAMINOSIS D: ICD-10-CM

## 2020-05-29 DIAGNOSIS — M79.7 FIBROMYALGIA: ICD-10-CM

## 2020-05-29 DIAGNOSIS — Z79.52 CURRENT CHRONIC USE OF SYSTEMIC STEROIDS: ICD-10-CM

## 2020-05-29 DIAGNOSIS — K29.40 ATROPHIC GASTRITIS WITHOUT HEMORRHAGE: ICD-10-CM

## 2020-05-29 DIAGNOSIS — E06.9 THYROIDITIS: ICD-10-CM

## 2020-05-29 DIAGNOSIS — E03.9 HYPOTHYROIDISM (ACQUIRED): ICD-10-CM

## 2020-05-29 DIAGNOSIS — D53.9 NUTRITIONAL ANEMIA: ICD-10-CM

## 2020-05-29 DIAGNOSIS — E78.5 HYPERLIPIDEMIA, UNSPECIFIED HYPERLIPIDEMIA TYPE: ICD-10-CM

## 2020-05-29 DIAGNOSIS — E66.9 OBESITY (BMI 30-39.9): ICD-10-CM

## 2020-05-29 DIAGNOSIS — E88.810 DYSMETABOLIC SYNDROME: ICD-10-CM

## 2020-05-29 DIAGNOSIS — E06.3 HASHIMOTO'S DISEASE: Primary | ICD-10-CM

## 2020-05-29 PROCEDURE — 3008F BODY MASS INDEX DOCD: CPT | Mod: CPTII,S$GLB,, | Performed by: INTERNAL MEDICINE

## 2020-05-29 PROCEDURE — 99214 PR OFFICE/OUTPT VISIT, EST, LEVL IV, 30-39 MIN: ICD-10-PCS | Mod: S$GLB,,, | Performed by: INTERNAL MEDICINE

## 2020-05-29 PROCEDURE — 3077F PR MOST RECENT SYSTOLIC BLOOD PRESSURE >= 140 MM HG: ICD-10-PCS | Mod: CPTII,S$GLB,, | Performed by: INTERNAL MEDICINE

## 2020-05-29 PROCEDURE — 3008F PR BODY MASS INDEX (BMI) DOCUMENTED: ICD-10-PCS | Mod: CPTII,S$GLB,, | Performed by: INTERNAL MEDICINE

## 2020-05-29 PROCEDURE — 99999 PR PBB SHADOW E&M-EST. PATIENT-LVL III: ICD-10-PCS | Mod: PBBFAC,,, | Performed by: INTERNAL MEDICINE

## 2020-05-29 PROCEDURE — 99214 OFFICE O/P EST MOD 30 MIN: CPT | Mod: S$GLB,,, | Performed by: INTERNAL MEDICINE

## 2020-05-29 PROCEDURE — 3080F PR MOST RECENT DIASTOLIC BLOOD PRESSURE >= 90 MM HG: ICD-10-PCS | Mod: CPTII,S$GLB,, | Performed by: INTERNAL MEDICINE

## 2020-05-29 PROCEDURE — 3080F DIAST BP >= 90 MM HG: CPT | Mod: CPTII,S$GLB,, | Performed by: INTERNAL MEDICINE

## 2020-05-29 PROCEDURE — 3077F SYST BP >= 140 MM HG: CPT | Mod: CPTII,S$GLB,, | Performed by: INTERNAL MEDICINE

## 2020-05-29 PROCEDURE — 99999 PR PBB SHADOW E&M-EST. PATIENT-LVL III: CPT | Mod: PBBFAC,,, | Performed by: INTERNAL MEDICINE

## 2020-05-29 RX ORDER — CIPROFLOXACIN 500 MG/1
TABLET ORAL
COMMUNITY
Start: 2020-04-16

## 2020-05-29 RX ORDER — METRONIDAZOLE 500 MG/1
TABLET ORAL
COMMUNITY
Start: 2020-04-14

## 2020-05-29 RX ORDER — MELOXICAM 15 MG/1
TABLET ORAL
COMMUNITY
Start: 2020-05-19

## 2020-05-29 RX ORDER — PREDNISONE 5 MG/1
5 TABLET ORAL DAILY
Qty: 90 TABLET | Refills: 3 | Status: SHIPPED | OUTPATIENT
Start: 2020-05-29 | End: 2020-08-27

## 2020-05-29 NOTE — PROGRESS NOTES
Subjective:      Patient ID: Winifred Love is a 55 y.o. female.    Chief Complaint:      Patient is a 55 yr old postmenopausal lady with hypothyroidism on thyroid hormone repletion seen in [facility] today.       History of Present Illness    Patient is a 55 yr old postmenopausal lady with hypothyroidism presumably due to Hashimoto's disease  Seen in [facility] today.  She is presently on LT4 112mcg Qd. Her screening thyroid USS from 05/18 showed no thyroid nodules.  Her associated comorbidities are as detailed below;        Patient Active Problem List   Diagnosis    Hypothyroidism due to Hashimoto's thyroiditis    Essential hypertension    Obesity (BMI 30-39.9)    History of low potassium    B12 deficiency    History of vitamin D deficiency    Keratosis pilaris    Xerosis of skin    Polyarthralgia    Fibromyalgia    Adjustment disorder with mixed anxiety and depressed mood    Hyperlipidemia    Difficulty sleeping    Hashimoto's disease    Thyroiditis    Goiter    Abnormal thyroid blood test    Dysmetabolic syndrome    Hypercholesterolemia    Hypovitaminosis D    Postmenopausal      Patients baseline epworth score is 11. She has never had a prior sleep study.  Patient does have remote reports of snoring but none of apneic or gasping spells. She has no major problems with headaches. She has no major mid day somnolence.   She does have chronic fatigue and fibromyalgia. Patient also has concerns regarding progressive weight gain. She gained ~ 9 lbs over the last 2 years. Patients see Dr Weathers for fibromyalgia and non specific polyarthralgia and is on Gabapentin for this.  Patients father has MCTD and lupus.  Patients father has hyperthyroidism and her sister also has variable thyroid function problems.  Patient is menopausal in ~ 1 yr.  Patient has been known to be hypothyroid since 2006.   Patient stopped smoking in 2004.   Grav 5 Para 4+1 ( 4 alive).  Patients screening thyroid USs from 05/18 was  essentially unremarkable with no nodules seen.  Her screening DEXA from 05/18  Was normal and thus her next up DEXA should be for ~ 05/20.  Patient has been on of LT4 since November 2018. Patient has apparently stopped taking all her medications since November except her iron medications. Patient has apparently been taking her fathers prednisone (her father has prednisone which he takes for mixed connective tissue disease and Lupus). She is presently on prednisone 5mg QD as a self medication.  Patients rheumatologist is Dr Staples. She feels that the gabapentin she has been prescribed previously for fibromyalgias had not helped at all and instead she feels much better on prednisone.  Patient has not been seen for some time and has been struggling with her father health.  She has been of synthroid for several months and has been of virtually all her other medications.  Patient had a fall in Feb this year and fractures of her left forearm.       Review of Systems   Constitutional: Positive for fatigue (chronic but stable). Negative for diaphoresis.   HENT: Negative for facial swelling, hearing loss, trouble swallowing and voice change.    Eyes: Negative for photophobia and visual disturbance.   Respiratory: Negative for cough and shortness of breath.    Cardiovascular: Negative for chest pain, palpitations and leg swelling.   Gastrointestinal: Negative for abdominal distention, abdominal pain, nausea and vomiting.   Genitourinary: Negative for menstrual problem (postmenopausal).   Musculoskeletal: Positive for back pain (chronic). Negative for arthralgias and gait problem.   Skin: Negative for color change, pallor and rash.   Neurological: Negative for numbness and headaches.   Hematological: Does not bruise/bleed easily.   Psychiatric/Behavioral: Negative for confusion. The patient is not nervous/anxious and is not hyperactive.        Objective: BP (!) 170/92 (BP Location: Right arm, Patient Position: Sitting, BP  "Method: Large (Manual))   Pulse 80   Temp 98.1 °F (36.7 °C) (Oral)   Ht 5' 4" (1.626 m)   Wt 92 kg (202 lb 11.4 oz)   LMP 05/18/2017 (Exact Date)   BMI 34.80 kg/m²  Body surface area is 2.04 meters squared. BP on my recheck is 182/100         Physical Exam   Constitutional: She is oriented to person, place, and time. She appears well-developed and well-nourished. No distress.   Pleasant middle aged lady. Obese. Looks tired. Not pale, anicteric and afebrile. Well hydrated. Not in chronic ill health. No moon facies, no excess facial acne, skin tags nor nuchal fat pad.   HENT:   Head: Normocephalic and atraumatic.   Eyes: Pupils are equal, round, and reactive to light. Conjunctivae and EOM are normal. No scleral icterus.   Neck: Normal range of motion. Neck supple. No JVD present. No tracheal deviation present. No thyromegaly present.   Cardiovascular: Normal rate, regular rhythm and normal heart sounds.   No murmur heard.  Pulmonary/Chest: Effort normal and breath sounds normal. No stridor. No respiratory distress. She has no wheezes.   Abdominal: She exhibits no distension. There is no tenderness.   Obese anterior abdominal wall   Musculoskeletal: Normal range of motion. She exhibits no edema or deformity.   Has well healed left lateral surgical scar on left elbow with mild mild non pitting swelling of left arm + limitation in clenching capacity of left hand.   Neurological: She is alert and oriented to person, place, and time. No cranial nerve deficit.   Skin: Skin is warm and dry. She is not diaphoretic. No erythema. No pallor.   Psychiatric: She has a normal mood and affect. Her behavior is normal. Judgment and thought content normal.   Vitals reviewed.      Lab Review:   No recent lab available for review.    Assessment:     1. Hashimoto's disease     2. Hypothyroidism (acquired)  TSH    T3    Thyroglobulin    CBC auto differential    Lipid Panel    Uric acid    T4, free   3. History of vitamin D deficiency "     4. Hypovitaminosis D  DXA Bone Density Spine And Hip    Vitamin D    PTH, intact    Calcium, Ionized   5. Thyroiditis     6. Obesity (BMI 30-39.9)     7. Nutritional anemia     8. Postmenopausal  DXA Bone Density Spine And Hip   9. Essential hypertension  Comprehensive metabolic panel    Urinalysis    Microalbumin/creatinine urine ratio   10. Hyperlipidemia, unspecified hyperlipidemia type     11. Xerosis of skin     12. Adjustment disorder with mixed anxiety and depressed mood     13. Goiter     14. Dysmetabolic syndrome  Hemoglobin A1C    Lipid Panel    Uric acid   15. B12 deficiency     16. Atrophic gastritis without hemorrhage     17. Fibromyalgia     18. Current chronic use of systemic steroids  DXA Bone Density Spine And Hip        Regarding hypothyroidism; to continue Lt4 therapy as before and will obtain full TFTs and antibody profile as detailed above.  To restart her synthroid 112mcg QD immediately.  Regarding hyperlipidemia;  Will check lipid levels today and decide based on results regarding potential need for antilipidemic therapy.  Regarding depression; To continue SSRI therapy as before.  Regarding chronic fatigue and fibromyalgia;She is yet to established a relationship with a rheumatologist she is comfortable with. Will obtain basic adrenal screening to exclude any significant adrenal insufficiency. For now to continue low dose prednisone 5mg Qd as before. Advised regarding the potential risks of long term chronic steroid use.  Regarding hypovitaminosis D; to check 25OH vit d levels and based on results may need to adjust current repletion dose.  Regarding hypertension; Presently suboptimally controlled.  To continue present antihypertensive regimen and continue serial tracking of ambulatory BP trends. To send results of BP and pulse rate ambulatory tracking after 1-2 weeks and if elevation persists will need medication dose adjustment. She is presently just on Lotensin 10-12.5mg  Regarding  Obesity and dystmetabolic syndrome; to obtain basic screening labs as detailed above. Her recent weight gain is multifactorial and this was discussed in face to face counselling of patient; tobacco cessation, recent post menopausal transition, high dose neurontin and Zoloft use are all contributors. Will discuss management strategy in more detail @ ffup visit.  Regarding vit B12 deficiency; to recheck Vit B12 and folate levels and will replete based on results.    Plan:     FFup in ~ 6mths.

## 2020-09-15 ENCOUNTER — HOSPITAL ENCOUNTER (OUTPATIENT)
Dept: RADIOLOGY | Facility: HOSPITAL | Age: 55
Discharge: HOME | End: 2020-09-15
Attending: INTERNAL MEDICINE
Payer: COMMERCIAL

## 2020-09-15 DIAGNOSIS — Z78.0 POSTMENOPAUSAL: ICD-10-CM

## 2020-09-15 DIAGNOSIS — E55.9 HYPOVITAMINOSIS D: ICD-10-CM

## 2020-09-15 DIAGNOSIS — Z79.52 CURRENT CHRONIC USE OF SYSTEMIC STEROIDS: ICD-10-CM

## 2020-09-22 ENCOUNTER — HOSPITAL ENCOUNTER (OUTPATIENT)
Dept: RADIOLOGY | Facility: CLINIC | Age: 55
Discharge: HOME | End: 2020-09-22
Attending: INTERNAL MEDICINE
Payer: COMMERCIAL

## 2020-09-22 DIAGNOSIS — M85.89 OSTEOPENIA OF MULTIPLE SITES: ICD-10-CM

## 2020-09-22 PROCEDURE — 77080 DEXA BONE DENSITY SPINE HIP: ICD-10-PCS | Mod: 26,,, | Performed by: INTERNAL MEDICINE

## 2020-09-22 PROCEDURE — 77080 DXA BONE DENSITY AXIAL: CPT | Mod: 26,,, | Performed by: INTERNAL MEDICINE

## 2020-09-22 PROCEDURE — 77080 DXA BONE DENSITY AXIAL: CPT | Mod: TC,PO

## 2020-10-27 ENCOUNTER — LAB VISIT (OUTPATIENT)
Dept: LAB | Facility: HOSPITAL | Age: 55
End: 2020-10-27
Attending: INTERNAL MEDICINE
Payer: COMMERCIAL

## 2020-10-27 ENCOUNTER — OFFICE VISIT (OUTPATIENT)
Dept: ENDOCRINOLOGY | Facility: CLINIC | Age: 55
End: 2020-10-27
Payer: COMMERCIAL

## 2020-10-27 VITALS
TEMPERATURE: 98 F | WEIGHT: 207.44 LBS | HEIGHT: 64 IN | OXYGEN SATURATION: 98 % | HEART RATE: 88 BPM | DIASTOLIC BLOOD PRESSURE: 90 MMHG | BODY MASS INDEX: 35.41 KG/M2 | SYSTOLIC BLOOD PRESSURE: 130 MMHG

## 2020-10-27 DIAGNOSIS — I10 ESSENTIAL HYPERTENSION: ICD-10-CM

## 2020-10-27 DIAGNOSIS — Z86.39 HISTORY OF VITAMIN D DEFICIENCY: ICD-10-CM

## 2020-10-27 DIAGNOSIS — Z79.52 CURRENT CHRONIC USE OF SYSTEMIC STEROIDS: ICD-10-CM

## 2020-10-27 DIAGNOSIS — E78.5 HYPERLIPIDEMIA, UNSPECIFIED HYPERLIPIDEMIA TYPE: ICD-10-CM

## 2020-10-27 DIAGNOSIS — K29.40 ATROPHIC GASTRITIS WITHOUT HEMORRHAGE: ICD-10-CM

## 2020-10-27 DIAGNOSIS — E55.9 HYPOVITAMINOSIS D: ICD-10-CM

## 2020-10-27 DIAGNOSIS — F43.23 ADJUSTMENT DISORDER WITH MIXED ANXIETY AND DEPRESSED MOOD: ICD-10-CM

## 2020-10-27 DIAGNOSIS — E06.9 THYROIDITIS: ICD-10-CM

## 2020-10-27 DIAGNOSIS — E88.810 DYSMETABOLIC SYNDROME: ICD-10-CM

## 2020-10-27 DIAGNOSIS — M79.7 FIBROMYALGIA: ICD-10-CM

## 2020-10-27 DIAGNOSIS — E06.3 HASHIMOTO'S DISEASE: ICD-10-CM

## 2020-10-27 DIAGNOSIS — E04.9 GOITER: ICD-10-CM

## 2020-10-27 DIAGNOSIS — M85.89 OSTEOPENIA OF MULTIPLE SITES: ICD-10-CM

## 2020-10-27 DIAGNOSIS — Z78.0 POSTMENOPAUSAL: ICD-10-CM

## 2020-10-27 DIAGNOSIS — E03.9 HYPOTHYROIDISM (ACQUIRED): Primary | ICD-10-CM

## 2020-10-27 DIAGNOSIS — E66.9 OBESITY (BMI 30-39.9): ICD-10-CM

## 2020-10-27 DIAGNOSIS — E53.8 B12 DEFICIENCY: ICD-10-CM

## 2020-10-27 LAB
BILIRUB UR QL STRIP: NEGATIVE
CLARITY UR REFRACT.AUTO: CLEAR
COLOR UR AUTO: YELLOW
GLUCOSE UR QL STRIP: NEGATIVE
HGB UR QL STRIP: NEGATIVE
KETONES UR QL STRIP: NEGATIVE
LEUKOCYTE ESTERASE UR QL STRIP: NEGATIVE
NITRITE UR QL STRIP: NEGATIVE
PH UR STRIP: 5 [PH] (ref 5–8)
PROT UR QL STRIP: NEGATIVE
SP GR UR STRIP: 1.01 (ref 1–1.03)
URN SPEC COLLECT METH UR: NORMAL

## 2020-10-27 PROCEDURE — 3080F DIAST BP >= 90 MM HG: CPT | Mod: CPTII,S$GLB,, | Performed by: INTERNAL MEDICINE

## 2020-10-27 PROCEDURE — 81003 URINALYSIS AUTO W/O SCOPE: CPT

## 2020-10-27 PROCEDURE — 3075F PR MOST RECENT SYSTOLIC BLOOD PRESS GE 130-139MM HG: ICD-10-PCS | Mod: CPTII,S$GLB,, | Performed by: INTERNAL MEDICINE

## 2020-10-27 PROCEDURE — 3008F BODY MASS INDEX DOCD: CPT | Mod: CPTII,S$GLB,, | Performed by: INTERNAL MEDICINE

## 2020-10-27 PROCEDURE — 3008F PR BODY MASS INDEX (BMI) DOCUMENTED: ICD-10-PCS | Mod: CPTII,S$GLB,, | Performed by: INTERNAL MEDICINE

## 2020-10-27 PROCEDURE — 99999 PR PBB SHADOW E&M-EST. PATIENT-LVL IV: CPT | Mod: PBBFAC,,, | Performed by: INTERNAL MEDICINE

## 2020-10-27 PROCEDURE — 99999 PR PBB SHADOW E&M-EST. PATIENT-LVL IV: ICD-10-PCS | Mod: PBBFAC,,, | Performed by: INTERNAL MEDICINE

## 2020-10-27 PROCEDURE — 82043 UR ALBUMIN QUANTITATIVE: CPT

## 2020-10-27 PROCEDURE — 99214 PR OFFICE/OUTPT VISIT, EST, LEVL IV, 30-39 MIN: ICD-10-PCS | Mod: S$GLB,,, | Performed by: INTERNAL MEDICINE

## 2020-10-27 PROCEDURE — 3080F PR MOST RECENT DIASTOLIC BLOOD PRESSURE >= 90 MM HG: ICD-10-PCS | Mod: CPTII,S$GLB,, | Performed by: INTERNAL MEDICINE

## 2020-10-27 PROCEDURE — 99214 OFFICE O/P EST MOD 30 MIN: CPT | Mod: S$GLB,,, | Performed by: INTERNAL MEDICINE

## 2020-10-27 PROCEDURE — 3075F SYST BP GE 130 - 139MM HG: CPT | Mod: CPTII,S$GLB,, | Performed by: INTERNAL MEDICINE

## 2020-10-27 RX ORDER — DOCUSATE SODIUM 100 MG/1
CAPSULE, LIQUID FILLED ORAL
COMMUNITY
Start: 2020-04-10

## 2020-10-27 RX ORDER — ERGOCALCIFEROL 1.25 MG/1
CAPSULE ORAL
Qty: 12 CAPSULE | Refills: 3 | Status: SHIPPED | OUTPATIENT
Start: 2020-10-27 | End: 2021-10-07

## 2020-10-27 RX ORDER — PREDNISONE 5 MG/1
5 TABLET ORAL DAILY
COMMUNITY
Start: 2020-09-16 | End: 2021-07-30

## 2020-10-27 RX ORDER — HYDROCHLOROTHIAZIDE 12.5 MG/1
12.5 CAPSULE ORAL
COMMUNITY
Start: 2020-10-15

## 2020-10-27 NOTE — PROGRESS NOTES
Subjective:      Patient ID: Winifred Love is a 55 y.o. female.    Chief Complaint:  Hypothyroidism      Patient is a 55 yr old postmenopausal lady with hypothyroidism on thyroid hormone repletion seen in [facility] today.    History of Present Illness    The patient, Ms Love is a 55 yr old postmenopausal lady with hypothyroidism presumably due to Hashimoto's disease  Seen in [facility] today.  She is presently on LT4 112mcg Qd. Her screening thyroid USS from 05/18 showed no thyroid nodules.  Her associated comorbidities are as detailed below;        Patient Active Problem List   Diagnosis    Hypothyroidism due to Hashimoto's thyroiditis    Essential hypertension    Obesity (BMI 30-39.9)    History of low potassium    B12 deficiency    History of vitamin D deficiency    Keratosis pilaris    Xerosis of skin    Polyarthralgia    Fibromyalgia    Adjustment disorder with mixed anxiety and depressed mood    Hyperlipidemia    Difficulty sleeping    Hashimoto's disease    Thyroiditis    Goiter    Abnormal thyroid blood test    Dysmetabolic syndrome    Hypercholesterolemia    Hypovitaminosis D    Postmenopausal      Patients baseline epworth score is 11. She has never had a prior sleep study.  Patient does have remote reports of snoring but none of apneic or gasping spells. She has no major problems with headaches. She has no major mid day somnolence.   She does have chronic fatigue and fibromyalgia. Patient also has concerns regarding progressive weight gain. She gained ~ 9 lbs over the last 2 years. Patients see Dr Weathers for fibromyalgia and non specific polyarthralgia and is on Gabapentin for this.  Patients father has MCTD and lupus.  Patients father has hyperthyroidism and her sister also has variable thyroid function problems.  Patient is menopausal in ~ 1 yr.  Patient has been known to be hypothyroid since 2006.   Patient stopped smoking in 2004.   Grav 5 Para 4+1 ( 4 alive).  Patients screening  thyroid USs from 05/18 was essentially unremarkable with no nodules seen.  Her screening DEXA from 09/22/20  showed stable osteopenia. Next FFup DEXA should be ~ 09/22.  Patient fell in 01/2020 and fell with left arm fracture.  Patient has been on of LT4 since November 2018. Patient has apparently stopped taking all her medications since November except her iron medications. Patient has apparently been taking her fathers prednisone (her father has prednisone which he takes for mixed connective tissue disease and Lupus). She is presently on prednisone 5mg QD as a self medication.  Patients rheumatologist is Dr Staples. She feels that the gabapentin she has been prescribed previously for fibromyalgias had not helped at all and instead she feels much better on prednisone.  Patient has not been seen for some time and has been struggling with her father health.  She has been of synthroid for several months and has been of virtually all her other medications.  Patient had a fall in Feb this year and fractures of her left forearm.       Review of Systems   Constitutional: Positive for fatigue (chronic but stable). Negative for diaphoresis.   HENT: Negative for facial swelling, hearing loss, trouble swallowing and voice change.    Eyes: Negative for photophobia and visual disturbance.   Respiratory: Negative for cough and shortness of breath.    Cardiovascular: Negative for chest pain, palpitations and leg swelling.   Gastrointestinal: Negative for abdominal distention, abdominal pain, nausea and vomiting.   Genitourinary: Negative for menstrual problem (postmenopausal).   Musculoskeletal: Positive for back pain (chronic). Negative for arthralgias and gait problem.   Skin: Negative for color change, pallor and rash.   Neurological: Negative for numbness and headaches.   Hematological: Does not bruise/bleed easily.   Psychiatric/Behavioral: Negative for confusion. The patient is not nervous/anxious and is not hyperactive.   "      Objective: BP (!) 130/90 (BP Location: Right arm, Patient Position: Sitting, BP Method: Large (Manual))   Pulse 88   Temp 97.7 °F (36.5 °C) (Temporal)   Ht 5' 4" (1.626 m)   Wt 94.1 kg (207 lb 7.3 oz)   LMP 05/18/2017 (Exact Date)   SpO2 98%   BMI 35.61 kg/m²  Body surface area is 2.06 meters squared.         Physical Exam  Vitals signs reviewed.   Constitutional:       General: She is not in acute distress.     Appearance: She is well-developed. She is not diaphoretic.      Comments: Pleasant middle aged lady. Obese. Looks tired. Not pale, anicteric and afebrile. Well hydrated. Not in chronic ill health. No moon facies, no excess facial acne, skin tags nor nuchal fat pad.   HENT:      Head: Normocephalic and atraumatic.   Eyes:      General: No scleral icterus.     Conjunctiva/sclera: Conjunctivae normal.      Pupils: Pupils are equal, round, and reactive to light.   Neck:      Musculoskeletal: Normal range of motion and neck supple.      Thyroid: No thyromegaly.      Vascular: No JVD.      Trachea: No tracheal deviation.   Cardiovascular:      Rate and Rhythm: Normal rate and regular rhythm.      Heart sounds: Normal heart sounds. No murmur.   Pulmonary:      Effort: Pulmonary effort is normal. No respiratory distress.      Breath sounds: Normal breath sounds. No stridor. No wheezing.   Abdominal:      General: There is no distension.      Tenderness: There is no abdominal tenderness.      Comments: Obese anterior abdominal wall   Musculoskeletal: Normal range of motion.         General: No deformity.        Arms:       Comments: Has well healed left lateral surgical scar on left elbow with mild mild non pitting swelling of left arm + limitation in clenching capacity of left hand.   Skin:     General: Skin is warm and dry.      Coloration: Skin is not pale.      Findings: No erythema.   Neurological:      Mental Status: She is alert and oriented to person, place, and time.      Cranial Nerves: No " cranial nerve deficit.   Psychiatric:         Behavior: Behavior normal.         Thought Content: Thought content normal.         Judgment: Judgment normal.         Lab Review:     Results for DEXTER OLSEN (MRN [id number]) as of 10/27/2020 08:15   Ref. Range 12/18/2019 07:42 12/18/2019 07:48 12/18/2019 07:49 9/22/2020 08:14   Vitamin B-12 Latest Ref Range: 210 - 950 pg/mL  185 219    Calcium, Ion Latest Ref Range: 1.06 - 1.42 mmol/L   1.18    Triglycerides Latest Ref Range: 30 - 150 mg/dL  93     Cholesterol Latest Ref Range: 120 - 199 mg/dL  278 (H)     HDL Latest Ref Range: 40 - 75 mg/dL  66     Hdl/Cholesterol Ratio Latest Ref Range: 20.0 - 50.0 %  23.7     LDL Cholesterol External Latest Ref Range: 63.0 - 159.0 mg/dL  193.4 (H)     Non-HDL Cholesterol Latest Units: mg/dL  212     Total Cholesterol/HDL Ratio Latest Ref Range: 2.0 - 5.0   4.2     B12 Def. Methylmalonic Acid Latest Ref Range: <=0.40 nmol/mL   0.11    Vit D, 25-Hydroxy Latest Ref Range: 30 - 96 ng/mL   19 (L)    ALDOSTERONE Latest Units: ng/dL   18.0    Cortisol Latest Units: ug/dL   13.50    Hemoglobin A1C External Latest Ref Range: 4.0 - 5.6 %   5.6    Estimated Avg Glucose Latest Ref Range: 68 - 131 mg/dL   114    ACTH Latest Ref Range: 0 - 46 pg/mL 53 (H)      TSH Latest Ref Range: 0.400 - 4.000 uIU/mL   47.517 (H)    T3, Total Latest Ref Range: 60 - 180 ng/dL   103    Free T4 Latest Ref Range: 0.71 - 1.51 ng/dL   0.66 (L)    Thyroglobulin Interpretation Unknown  SEE BELOW     Thyroglobulin Antibody Screen Latest Ref Range: <4.0 IU/mL  >500 (H)     Thyroglobulin, Tumor Marker Latest Units: ng/mL  8.9 (H)     PTH Latest Ref Range: 9.0 - 77.0 pg/mL   103.3 (H)    DHEA Latest Ref Range: 0.630 - 4.700 ng/mL   1.480    DHEA-SO4 Latest Ref Range: 29.7 - 182.2 ug/dL   34.2        Assessment:     1. Hypothyroidism (acquired)     2. Hashimoto's disease     3. Hypovitaminosis D     4. Obesity (BMI 30-39.9)     5. Thyroiditis     6. Goiter     7.  Dysmetabolic syndrome     8. B12 deficiency     9. Atrophic gastritis without hemorrhage     10. Osteopenia of multiple sites     11. Current chronic use of systemic steroids     12. Fibromyalgia     13. Postmenopausal     14. Hyperlipidemia, unspecified hyperlipidemia type     15. Essential hypertension     16. Adjustment disorder with mixed anxiety and depressed mood          Regarding hypothyroidism; to continue Lt4 therapy as before and will obtain full TFTs and antibody profile as detailed above.  To restart her synthroid 112mcg QD immediately.  Regarding hyperlipidemia;  Will check lipid levels today and decide based on results regarding potential need for antilipidemic therapy.  Regarding depression; To continue SSRI therapy as before.  Regarding chronic fatigue and fibromyalgia;She is yet to established a relationship with a rheumatologist she is comfortable with. Will obtain basic adrenal screening to exclude any significant adrenal insufficiency. For now to continue low dose prednisone 5mg Qd as before. Advised regarding the potential risks of long term chronic steroid use.  Regarding hypovitaminosis D; to check 25OH vit d levels and based on results may need to adjust current repletion dose.  Regarding hypertension; Presently suboptimally controlled.  To continue present antihypertensive regimen and continue serial tracking of ambulatory BP trends. To send results of BP and pulse rate ambulatory tracking after 1-2 weeks and if elevation persists will need medication dose adjustment. She is presently just on Lotensin 10-12.5mg  Regarding Obesity and dystmetabolic syndrome; to obtain basic screening labs as detailed above. Her recent weight gain is multifactorial and this was discussed in face to face counselling of patient; tobacco cessation, recent post menopausal transition, high dose neurontin and Zoloft use are all contributors. Will discuss management strategy in more detail @ ffup visit.  Regarding  vit B12 deficiency; to recheck Vit B12 and folate levels and will replete based on results.  Regarding osteopenia; to ensure restart of daily calcium and vitamin D supplementation.    Plan:       FFup in ~ 4mths.

## 2020-10-28 ENCOUNTER — PATIENT MESSAGE (OUTPATIENT)
Dept: ENDOCRINOLOGY | Facility: CLINIC | Age: 55
End: 2020-10-28

## 2020-10-28 LAB
ALBUMIN/CREAT UR: 6.9 UG/MG (ref 0–30)
CREAT UR-MCNC: 72 MG/DL (ref 15–325)
MICROALBUMIN UR DL<=1MG/L-MCNC: 5 UG/ML

## 2020-10-29 DIAGNOSIS — I10 ESSENTIAL HYPERTENSION: ICD-10-CM

## 2020-10-29 DIAGNOSIS — E03.8 HYPOTHYROIDISM DUE TO HASHIMOTO'S THYROIDITIS: ICD-10-CM

## 2020-10-29 DIAGNOSIS — E06.3 HYPOTHYROIDISM DUE TO HASHIMOTO'S THYROIDITIS: ICD-10-CM

## 2020-10-29 RX ORDER — BENAZEPRIL HYDROCHLORIDE AND HYDROCHLOROTHIAZIDE 10; 12.5 MG/1; MG/1
1 TABLET ORAL DAILY
Qty: 90 TABLET | Refills: 3 | Status: SHIPPED | OUTPATIENT
Start: 2020-10-29 | End: 2021-11-27 | Stop reason: SDUPTHER

## 2020-10-29 RX ORDER — LEVOTHYROXINE SODIUM 125 UG/1
125 TABLET ORAL
Qty: 90 TABLET | Refills: 3 | Status: SHIPPED | OUTPATIENT
Start: 2020-10-29 | End: 2021-01-27

## 2021-04-08 ENCOUNTER — PATIENT MESSAGE (OUTPATIENT)
Dept: ENDOCRINOLOGY | Facility: CLINIC | Age: 56
End: 2021-04-08

## 2021-04-09 DIAGNOSIS — E53.8 B12 DEFICIENCY: Primary | ICD-10-CM

## 2021-04-09 DIAGNOSIS — K29.40 ATROPHIC GASTRITIS WITHOUT HEMORRHAGE: ICD-10-CM

## 2021-04-16 ENCOUNTER — PATIENT MESSAGE (OUTPATIENT)
Dept: RESEARCH | Facility: HOSPITAL | Age: 56
End: 2021-04-16

## 2021-04-19 ENCOUNTER — PATIENT MESSAGE (OUTPATIENT)
Dept: ENDOCRINOLOGY | Facility: CLINIC | Age: 56
End: 2021-04-19

## 2021-04-21 ENCOUNTER — LAB VISIT (OUTPATIENT)
Dept: LAB | Facility: HOSPITAL | Age: 56
End: 2021-04-21
Attending: INTERNAL MEDICINE
Payer: COMMERCIAL

## 2021-04-21 DIAGNOSIS — K29.40 ATROPHIC GASTRITIS WITHOUT HEMORRHAGE: ICD-10-CM

## 2021-04-21 DIAGNOSIS — E53.8 B12 DEFICIENCY: ICD-10-CM

## 2021-04-21 DIAGNOSIS — K29.40 ATROPHIC GASTRITIS WITHOUT HEMORRHAGE: Primary | ICD-10-CM

## 2021-04-21 DIAGNOSIS — E06.3 HYPOTHYROIDISM DUE TO HASHIMOTO'S THYROIDITIS: ICD-10-CM

## 2021-04-21 DIAGNOSIS — E03.8 HYPOTHYROIDISM DUE TO HASHIMOTO'S THYROIDITIS: ICD-10-CM

## 2021-04-21 LAB
BASOPHILS # BLD AUTO: 0.05 K/UL (ref 0–0.2)
BASOPHILS NFR BLD: 0.7 % (ref 0–1.9)
DIFFERENTIAL METHOD: ABNORMAL
EOSINOPHIL # BLD AUTO: 0 K/UL (ref 0–0.5)
EOSINOPHIL NFR BLD: 0.4 % (ref 0–8)
ERYTHROCYTE [DISTWIDTH] IN BLOOD BY AUTOMATED COUNT: 13 % (ref 11.5–14.5)
FOLATE SERPL-MCNC: 4.9 NG/ML (ref 4–24)
HCT VFR BLD AUTO: 40.7 % (ref 37–48.5)
HGB BLD-MCNC: 13.4 G/DL (ref 12–16)
IMM GRANULOCYTES # BLD AUTO: 0.04 K/UL (ref 0–0.04)
IMM GRANULOCYTES NFR BLD AUTO: 0.6 % (ref 0–0.5)
LYMPHOCYTES # BLD AUTO: 0.9 K/UL (ref 1–4.8)
LYMPHOCYTES NFR BLD: 12.9 % (ref 18–48)
MCH RBC QN AUTO: 28.3 PG (ref 27–31)
MCHC RBC AUTO-ENTMCNC: 32.9 G/DL (ref 32–36)
MCV RBC AUTO: 86 FL (ref 82–98)
MONOCYTES # BLD AUTO: 0.4 K/UL (ref 0.3–1)
MONOCYTES NFR BLD: 5.1 % (ref 4–15)
NEUTROPHILS # BLD AUTO: 5.8 K/UL (ref 1.8–7.7)
NEUTROPHILS NFR BLD: 80.3 % (ref 38–73)
NRBC BLD-RTO: 0 /100 WBC
PLATELET # BLD AUTO: 186 K/UL (ref 150–450)
PMV BLD AUTO: 9.9 FL (ref 9.2–12.9)
RBC # BLD AUTO: 4.74 M/UL (ref 4–5.4)
T3 SERPL-MCNC: 101 NG/DL (ref 60–180)
T4 FREE SERPL-MCNC: 1.17 NG/DL (ref 0.71–1.51)
TSH SERPL DL<=0.005 MIU/L-ACNC: 0.2 UIU/ML (ref 0.4–4)
VIT B12 SERPL-MCNC: 173 PG/ML (ref 210–950)
WBC # BLD AUTO: 7.22 K/UL (ref 3.9–12.7)

## 2021-04-21 PROCEDURE — 84439 ASSAY OF FREE THYROXINE: CPT | Performed by: INTERNAL MEDICINE

## 2021-04-21 PROCEDURE — 84480 ASSAY TRIIODOTHYRONINE (T3): CPT | Performed by: INTERNAL MEDICINE

## 2021-04-21 PROCEDURE — 82607 VITAMIN B-12: CPT | Performed by: INTERNAL MEDICINE

## 2021-04-21 PROCEDURE — 82747 ASSAY OF FOLIC ACID RBC: CPT | Performed by: INTERNAL MEDICINE

## 2021-04-21 PROCEDURE — 82607 VITAMIN B-12: CPT | Mod: 91 | Performed by: INTERNAL MEDICINE

## 2021-04-21 PROCEDURE — 84432 ASSAY OF THYROGLOBULIN: CPT | Performed by: INTERNAL MEDICINE

## 2021-04-21 PROCEDURE — 85025 COMPLETE CBC W/AUTO DIFF WBC: CPT | Performed by: INTERNAL MEDICINE

## 2021-04-21 PROCEDURE — 82746 ASSAY OF FOLIC ACID SERUM: CPT | Performed by: INTERNAL MEDICINE

## 2021-04-21 PROCEDURE — 84443 ASSAY THYROID STIM HORMONE: CPT | Performed by: INTERNAL MEDICINE

## 2021-04-21 PROCEDURE — 83921 ORGANIC ACID SINGLE QUANT: CPT | Performed by: INTERNAL MEDICINE

## 2021-04-21 RX ORDER — CYANOCOBALAMIN 1000 UG/ML
1000 INJECTION, SOLUTION INTRAMUSCULAR; SUBCUTANEOUS
Qty: 3 ML | Refills: 3 | Status: SHIPPED | OUTPATIENT
Start: 2021-04-21 | End: 2021-07-20

## 2021-04-22 LAB — VIT B12 SERPL-MCNC: 151 NG/L (ref 180–914)

## 2021-04-23 LAB — FOLATE RBC-MCNC: 662 NG/ML (ref 280–791)

## 2021-04-26 ENCOUNTER — OFFICE VISIT (OUTPATIENT)
Dept: ENDOCRINOLOGY | Facility: CLINIC | Age: 56
End: 2021-04-26
Payer: COMMERCIAL

## 2021-04-26 VITALS
DIASTOLIC BLOOD PRESSURE: 86 MMHG | BODY MASS INDEX: 36.3 KG/M2 | TEMPERATURE: 98 F | SYSTOLIC BLOOD PRESSURE: 128 MMHG | WEIGHT: 212.63 LBS | HEIGHT: 64 IN | HEART RATE: 92 BPM | OXYGEN SATURATION: 97 %

## 2021-04-26 DIAGNOSIS — E06.9 THYROIDITIS: ICD-10-CM

## 2021-04-26 DIAGNOSIS — M25.50 POLYARTHRALGIA: ICD-10-CM

## 2021-04-26 DIAGNOSIS — E55.9 HYPOVITAMINOSIS D: ICD-10-CM

## 2021-04-26 DIAGNOSIS — F43.23 ADJUSTMENT DISORDER WITH MIXED ANXIETY AND DEPRESSED MOOD: ICD-10-CM

## 2021-04-26 DIAGNOSIS — E88.810 DYSMETABOLIC SYNDROME: ICD-10-CM

## 2021-04-26 DIAGNOSIS — M85.89 OSTEOPENIA OF MULTIPLE SITES: ICD-10-CM

## 2021-04-26 DIAGNOSIS — R73.09 DYSGLYCEMIA: ICD-10-CM

## 2021-04-26 DIAGNOSIS — D51.0 PERNICIOUS ANEMIA: ICD-10-CM

## 2021-04-26 DIAGNOSIS — Z86.39 HISTORY OF VITAMIN D DEFICIENCY: ICD-10-CM

## 2021-04-26 DIAGNOSIS — E53.8 B12 DEFICIENCY: ICD-10-CM

## 2021-04-26 DIAGNOSIS — Z79.52 CURRENT CHRONIC USE OF SYSTEMIC STEROIDS: ICD-10-CM

## 2021-04-26 DIAGNOSIS — I10 ESSENTIAL HYPERTENSION: ICD-10-CM

## 2021-04-26 DIAGNOSIS — E06.3 HASHIMOTO'S DISEASE: Primary | ICD-10-CM

## 2021-04-26 DIAGNOSIS — D53.9 NUTRITIONAL ANEMIA: ICD-10-CM

## 2021-04-26 DIAGNOSIS — E66.9 OBESITY (BMI 30-39.9): ICD-10-CM

## 2021-04-26 DIAGNOSIS — K29.40 ATROPHIC GASTRITIS WITHOUT HEMORRHAGE: ICD-10-CM

## 2021-04-26 DIAGNOSIS — E03.9 HYPOTHYROIDISM (ACQUIRED): ICD-10-CM

## 2021-04-26 DIAGNOSIS — Z78.0 POSTMENOPAUSAL: ICD-10-CM

## 2021-04-26 LAB — METHYLMALONATE SERPL-SCNC: 0.13 NMOL/ML

## 2021-04-26 PROCEDURE — 99214 OFFICE O/P EST MOD 30 MIN: CPT | Mod: S$PBB,,, | Performed by: INTERNAL MEDICINE

## 2021-04-26 PROCEDURE — 99214 PR OFFICE/OUTPT VISIT, EST, LEVL IV, 30-39 MIN: ICD-10-PCS | Mod: S$PBB,,, | Performed by: INTERNAL MEDICINE

## 2021-04-26 PROCEDURE — 99999 PR PBB SHADOW E&M-EST. PATIENT-LVL V: ICD-10-PCS | Mod: PBBFAC,,, | Performed by: INTERNAL MEDICINE

## 2021-04-26 PROCEDURE — 99999 PR PBB SHADOW E&M-EST. PATIENT-LVL V: CPT | Mod: PBBFAC,,, | Performed by: INTERNAL MEDICINE

## 2021-04-26 RX ORDER — NAPROXEN SODIUM 220 MG/1
81 TABLET, FILM COATED ORAL DAILY
Qty: 90 TABLET | Refills: 3 | Status: SHIPPED | OUTPATIENT
Start: 2021-04-26 | End: 2021-11-27 | Stop reason: ALTCHOICE

## 2021-04-26 RX ORDER — LEVOTHYROXINE SODIUM 125 UG/1
125 TABLET ORAL EVERY MORNING
COMMUNITY
Start: 2021-02-03 | End: 2021-10-23

## 2021-04-26 RX ORDER — OMEPRAZOLE 20 MG/1
20 TABLET, DELAYED RELEASE ORAL NIGHTLY
Qty: 90 TABLET | Refills: 3 | Status: SHIPPED | OUTPATIENT
Start: 2021-04-26 | End: 2022-04-26

## 2021-04-27 ENCOUNTER — TELEPHONE (OUTPATIENT)
Dept: RHEUMATOLOGY | Facility: CLINIC | Age: 56
End: 2021-04-27

## 2021-04-27 ENCOUNTER — TELEPHONE (OUTPATIENT)
Dept: ENDOCRINOLOGY | Facility: CLINIC | Age: 56
End: 2021-04-27

## 2021-04-28 ENCOUNTER — TELEPHONE (OUTPATIENT)
Dept: FAMILY MEDICINE | Facility: CLINIC | Age: 56
End: 2021-04-28

## 2021-04-28 ENCOUNTER — PATIENT MESSAGE (OUTPATIENT)
Dept: ENDOCRINOLOGY | Facility: CLINIC | Age: 56
End: 2021-04-28

## 2021-06-28 ENCOUNTER — PATIENT MESSAGE (OUTPATIENT)
Dept: RHEUMATOLOGY | Facility: CLINIC | Age: 56
End: 2021-06-28

## 2021-10-08 ENCOUNTER — TELEPHONE (OUTPATIENT)
Dept: RHEUMATOLOGY | Facility: CLINIC | Age: 56
End: 2021-10-08

## 2021-10-08 ENCOUNTER — PATIENT MESSAGE (OUTPATIENT)
Dept: RHEUMATOLOGY | Facility: CLINIC | Age: 56
End: 2021-10-08

## 2021-11-02 ENCOUNTER — TELEPHONE (OUTPATIENT)
Dept: ENDOCRINOLOGY | Facility: CLINIC | Age: 56
End: 2021-11-02
Payer: COMMERCIAL

## 2021-11-27 ENCOUNTER — OFFICE VISIT (OUTPATIENT)
Dept: ENDOCRINOLOGY | Facility: CLINIC | Age: 56
End: 2021-11-27
Payer: COMMERCIAL

## 2021-11-27 DIAGNOSIS — M85.89 OSTEOPENIA OF MULTIPLE SITES: ICD-10-CM

## 2021-11-27 DIAGNOSIS — E66.9 OBESITY (BMI 30-39.9): ICD-10-CM

## 2021-11-27 DIAGNOSIS — Z79.52 CURRENT CHRONIC USE OF SYSTEMIC STEROIDS: ICD-10-CM

## 2021-11-27 DIAGNOSIS — Z78.0 POSTMENOPAUSAL: ICD-10-CM

## 2021-11-27 DIAGNOSIS — M79.7 FIBROMYALGIA: ICD-10-CM

## 2021-11-27 DIAGNOSIS — E78.00 HYPERCHOLESTEROLEMIA: ICD-10-CM

## 2021-11-27 DIAGNOSIS — D51.0 PERNICIOUS ANEMIA: ICD-10-CM

## 2021-11-27 DIAGNOSIS — I10 ESSENTIAL HYPERTENSION: ICD-10-CM

## 2021-11-27 DIAGNOSIS — F43.23 ADJUSTMENT DISORDER WITH MIXED ANXIETY AND DEPRESSED MOOD: ICD-10-CM

## 2021-11-27 DIAGNOSIS — L85.3 XEROSIS OF SKIN: ICD-10-CM

## 2021-11-27 DIAGNOSIS — K29.40 ATROPHIC GASTRITIS WITHOUT HEMORRHAGE: ICD-10-CM

## 2021-11-27 DIAGNOSIS — E06.9 THYROIDITIS: ICD-10-CM

## 2021-11-27 DIAGNOSIS — E06.3 HASHIMOTO'S DISEASE: ICD-10-CM

## 2021-11-27 DIAGNOSIS — E53.8 B12 DEFICIENCY: ICD-10-CM

## 2021-11-27 DIAGNOSIS — E78.5 HYPERLIPIDEMIA, UNSPECIFIED HYPERLIPIDEMIA TYPE: ICD-10-CM

## 2021-11-27 DIAGNOSIS — E55.9 HYPOVITAMINOSIS D: ICD-10-CM

## 2021-11-27 DIAGNOSIS — E03.9 HYPOTHYROIDISM (ACQUIRED): Primary | ICD-10-CM

## 2021-11-27 DIAGNOSIS — E88.810 DYSMETABOLIC SYNDROME: ICD-10-CM

## 2021-11-27 DIAGNOSIS — E04.9 GOITER: ICD-10-CM

## 2021-11-27 PROCEDURE — 99214 OFFICE O/P EST MOD 30 MIN: CPT | Mod: 95,,, | Performed by: INTERNAL MEDICINE

## 2021-11-27 PROCEDURE — 99214 PR OFFICE/OUTPT VISIT, EST, LEVL IV, 30-39 MIN: ICD-10-PCS | Mod: 95,,, | Performed by: INTERNAL MEDICINE

## 2021-11-27 RX ORDER — BENAZEPRIL HYDROCHLORIDE AND HYDROCHLOROTHIAZIDE 10; 12.5 MG/1; MG/1
1 TABLET ORAL DAILY
Qty: 90 TABLET | Refills: 3 | Status: SHIPPED | OUTPATIENT
Start: 2021-11-27 | End: 2022-02-25

## 2021-11-27 RX ORDER — PRAVASTATIN SODIUM 40 MG/1
40 TABLET ORAL DAILY
Qty: 90 TABLET | Refills: 3 | Status: SHIPPED | OUTPATIENT
Start: 2021-11-27 | End: 2022-11-27

## 2021-12-04 ENCOUNTER — LAB VISIT (OUTPATIENT)
Dept: LAB | Facility: HOSPITAL | Age: 56
End: 2021-12-04
Attending: INTERNAL MEDICINE
Payer: COMMERCIAL

## 2021-12-04 DIAGNOSIS — D51.0 PERNICIOUS ANEMIA: ICD-10-CM

## 2021-12-04 DIAGNOSIS — M85.89 OSTEOPENIA OF MULTIPLE SITES: ICD-10-CM

## 2021-12-04 DIAGNOSIS — E55.9 HYPOVITAMINOSIS D: ICD-10-CM

## 2021-12-04 DIAGNOSIS — E06.9 THYROIDITIS: ICD-10-CM

## 2021-12-04 DIAGNOSIS — R73.09 DYSGLYCEMIA: ICD-10-CM

## 2021-12-04 DIAGNOSIS — E53.8 B12 DEFICIENCY: ICD-10-CM

## 2021-12-04 DIAGNOSIS — E87.6 HYPOKALEMIA: Primary | ICD-10-CM

## 2021-12-04 DIAGNOSIS — Z78.0 POSTMENOPAUSAL: ICD-10-CM

## 2021-12-04 DIAGNOSIS — E03.9 HYPOTHYROIDISM (ACQUIRED): ICD-10-CM

## 2021-12-04 DIAGNOSIS — E06.3 HASHIMOTO'S DISEASE: ICD-10-CM

## 2021-12-04 DIAGNOSIS — I10 ESSENTIAL HYPERTENSION: ICD-10-CM

## 2021-12-04 DIAGNOSIS — E88.810 DYSMETABOLIC SYNDROME: ICD-10-CM

## 2021-12-04 DIAGNOSIS — K29.40 ATROPHIC GASTRITIS WITHOUT HEMORRHAGE: ICD-10-CM

## 2021-12-04 LAB
25(OH)D3+25(OH)D2 SERPL-MCNC: 33 NG/ML (ref 30–96)
ALBUMIN SERPL BCP-MCNC: 3.9 G/DL (ref 3.5–5.2)
ALP SERPL-CCNC: 91 U/L (ref 55–135)
ALT SERPL W/O P-5'-P-CCNC: 29 U/L (ref 10–44)
ANION GAP SERPL CALC-SCNC: 9 MMOL/L (ref 8–16)
AST SERPL-CCNC: 27 U/L (ref 10–40)
BASOPHILS # BLD AUTO: 0.04 K/UL (ref 0–0.2)
BASOPHILS NFR BLD: 0.7 % (ref 0–1.9)
BILIRUB SERPL-MCNC: 0.5 MG/DL (ref 0.1–1)
BUN SERPL-MCNC: 13 MG/DL (ref 6–20)
CA-I BLDV-SCNC: 1.25 MMOL/L (ref 1.06–1.42)
CALCIUM SERPL-MCNC: 9.4 MG/DL (ref 8.7–10.5)
CHLORIDE SERPL-SCNC: 101 MMOL/L (ref 95–110)
CHOLEST SERPL-MCNC: 224 MG/DL (ref 120–199)
CHOLEST/HDLC SERPL: 3.9 {RATIO} (ref 2–5)
CO2 SERPL-SCNC: 31 MMOL/L (ref 23–29)
CREAT SERPL-MCNC: 0.9 MG/DL (ref 0.5–1.4)
DIFFERENTIAL METHOD: ABNORMAL
EOSINOPHIL # BLD AUTO: 0.1 K/UL (ref 0–0.5)
EOSINOPHIL NFR BLD: 1.8 % (ref 0–8)
ERYTHROCYTE [DISTWIDTH] IN BLOOD BY AUTOMATED COUNT: 12.9 % (ref 11.5–14.5)
EST. GFR  (AFRICAN AMERICAN): >60 ML/MIN/1.73 M^2
EST. GFR  (NON AFRICAN AMERICAN): >60 ML/MIN/1.73 M^2
ESTIMATED AVG GLUCOSE: 117 MG/DL (ref 68–131)
FOLATE SERPL-MCNC: 6.6 NG/ML (ref 4–24)
GLUCOSE SERPL-MCNC: 118 MG/DL (ref 70–110)
HBA1C MFR BLD: 5.7 % (ref 4–5.6)
HCT VFR BLD AUTO: 43.3 % (ref 37–48.5)
HDLC SERPL-MCNC: 58 MG/DL (ref 40–75)
HDLC SERPL: 25.9 % (ref 20–50)
HGB BLD-MCNC: 13.6 G/DL (ref 12–16)
IMM GRANULOCYTES # BLD AUTO: 0.05 K/UL (ref 0–0.04)
IMM GRANULOCYTES NFR BLD AUTO: 0.8 % (ref 0–0.5)
LDLC SERPL CALC-MCNC: 140.4 MG/DL (ref 63–159)
LYMPHOCYTES # BLD AUTO: 1.3 K/UL (ref 1–4.8)
LYMPHOCYTES NFR BLD: 21.3 % (ref 18–48)
MCH RBC QN AUTO: 28.2 PG (ref 27–31)
MCHC RBC AUTO-ENTMCNC: 31.4 G/DL (ref 32–36)
MCV RBC AUTO: 90 FL (ref 82–98)
MONOCYTES # BLD AUTO: 0.6 K/UL (ref 0.3–1)
MONOCYTES NFR BLD: 10.1 % (ref 4–15)
NEUTROPHILS # BLD AUTO: 4 K/UL (ref 1.8–7.7)
NEUTROPHILS NFR BLD: 65.3 % (ref 38–73)
NONHDLC SERPL-MCNC: 166 MG/DL
NRBC BLD-RTO: 0 /100 WBC
PLATELET # BLD AUTO: 180 K/UL (ref 150–450)
PMV BLD AUTO: 9.8 FL (ref 9.2–12.9)
POTASSIUM SERPL-SCNC: 3.4 MMOL/L (ref 3.5–5.1)
PROT SERPL-MCNC: 7.4 G/DL (ref 6–8.4)
RBC # BLD AUTO: 4.83 M/UL (ref 4–5.4)
SODIUM SERPL-SCNC: 141 MMOL/L (ref 136–145)
T3 SERPL-MCNC: 104 NG/DL (ref 60–180)
T4 FREE SERPL-MCNC: 1.01 NG/DL (ref 0.71–1.51)
TRIGL SERPL-MCNC: 128 MG/DL (ref 30–150)
TSH SERPL DL<=0.005 MIU/L-ACNC: 1.22 UIU/ML (ref 0.4–4)
URATE SERPL-MCNC: 7.3 MG/DL (ref 2.4–5.7)
VIT B12 SERPL-MCNC: 570 PG/ML (ref 210–950)
WBC # BLD AUTO: 6.15 K/UL (ref 3.9–12.7)

## 2021-12-04 PROCEDURE — 83970 ASSAY OF PARATHORMONE: CPT | Performed by: INTERNAL MEDICINE

## 2021-12-04 PROCEDURE — 86256 FLUORESCENT ANTIBODY TITER: CPT | Performed by: INTERNAL MEDICINE

## 2021-12-04 PROCEDURE — 83036 HEMOGLOBIN GLYCOSYLATED A1C: CPT | Performed by: INTERNAL MEDICINE

## 2021-12-04 PROCEDURE — 84443 ASSAY THYROID STIM HORMONE: CPT | Performed by: INTERNAL MEDICINE

## 2021-12-04 PROCEDURE — 80053 COMPREHEN METABOLIC PANEL: CPT | Performed by: INTERNAL MEDICINE

## 2021-12-04 PROCEDURE — 82607 VITAMIN B-12: CPT | Mod: 91 | Performed by: INTERNAL MEDICINE

## 2021-12-04 PROCEDURE — 84550 ASSAY OF BLOOD/URIC ACID: CPT | Performed by: INTERNAL MEDICINE

## 2021-12-04 PROCEDURE — 82330 ASSAY OF CALCIUM: CPT | Performed by: INTERNAL MEDICINE

## 2021-12-04 PROCEDURE — 84439 ASSAY OF FREE THYROXINE: CPT | Performed by: INTERNAL MEDICINE

## 2021-12-04 PROCEDURE — 85025 COMPLETE CBC W/AUTO DIFF WBC: CPT | Performed by: INTERNAL MEDICINE

## 2021-12-04 PROCEDURE — 86800 THYROGLOBULIN ANTIBODY: CPT | Performed by: INTERNAL MEDICINE

## 2021-12-04 PROCEDURE — 80061 LIPID PANEL: CPT | Performed by: INTERNAL MEDICINE

## 2021-12-04 PROCEDURE — 82746 ASSAY OF FOLIC ACID SERUM: CPT | Performed by: INTERNAL MEDICINE

## 2021-12-04 PROCEDURE — 36415 COLL VENOUS BLD VENIPUNCTURE: CPT | Performed by: INTERNAL MEDICINE

## 2021-12-04 PROCEDURE — 82306 VITAMIN D 25 HYDROXY: CPT | Performed by: INTERNAL MEDICINE

## 2021-12-04 PROCEDURE — 82607 VITAMIN B-12: CPT | Performed by: INTERNAL MEDICINE

## 2021-12-04 PROCEDURE — 84480 ASSAY TRIIODOTHYRONINE (T3): CPT | Performed by: INTERNAL MEDICINE

## 2021-12-04 RX ORDER — POTASSIUM CHLORIDE 20 MEQ/1
20 TABLET, EXTENDED RELEASE ORAL DAILY
Qty: 90 TABLET | Refills: 3 | Status: SHIPPED | OUTPATIENT
Start: 2021-12-04 | End: 2022-03-04

## 2021-12-05 PROBLEM — R73.03 PREDIABETES: Status: ACTIVE | Noted: 2021-12-05

## 2021-12-06 LAB
PTH-INTACT SERPL-MCNC: 51.3 PG/ML (ref 9–77)
VIT B12 SERPL-MCNC: 516 NG/L (ref 180–914)

## 2021-12-09 LAB — PCA AB TITR SER IF: ABNORMAL {TITER}

## 2021-12-14 PROBLEM — E04.1 NODULAR THYROID DISEASE: Status: ACTIVE | Noted: 2021-12-14

## 2022-03-10 ENCOUNTER — PATIENT MESSAGE (OUTPATIENT)
Dept: RHEUMATOLOGY | Facility: CLINIC | Age: 57
End: 2022-03-10
Payer: COMMERCIAL

## 2022-08-13 ENCOUNTER — DOCUMENTATION ONLY (OUTPATIENT)
Dept: ENDOCRINOLOGY | Facility: CLINIC | Age: 57
End: 2022-08-13
Payer: MEDICARE

## 2022-08-13 DIAGNOSIS — D53.9 NUTRITIONAL ANEMIA: ICD-10-CM

## 2022-08-13 DIAGNOSIS — E04.1 NODULAR THYROID DISEASE: ICD-10-CM

## 2022-08-13 DIAGNOSIS — I10 ESSENTIAL HYPERTENSION: ICD-10-CM

## 2022-08-13 DIAGNOSIS — E53.8 B12 DEFICIENCY: ICD-10-CM

## 2022-08-13 DIAGNOSIS — E03.9 HYPOTHYROIDISM (ACQUIRED): ICD-10-CM

## 2022-08-13 DIAGNOSIS — M85.89 OSTEOPENIA OF MULTIPLE SITES: ICD-10-CM

## 2022-08-13 DIAGNOSIS — D51.0 PERNICIOUS ANEMIA: ICD-10-CM

## 2022-08-13 DIAGNOSIS — R73.03 PREDIABETES: ICD-10-CM

## 2022-08-13 DIAGNOSIS — R73.09 DYSGLYCEMIA: ICD-10-CM

## 2022-08-13 DIAGNOSIS — E88.810 DYSMETABOLIC SYNDROME: ICD-10-CM

## 2022-08-13 DIAGNOSIS — E78.00 HYPERCHOLESTEROLEMIA: ICD-10-CM

## 2022-08-13 DIAGNOSIS — E55.9 HYPOVITAMINOSIS D: Primary | ICD-10-CM

## 2022-08-13 NOTE — PROGRESS NOTES
Subjective:      Patient ID: Winifred Love is a 57 y.o. female.    Chief Complaint:   Hypothyroidism      Patient is a 57 yr old postmenopausal lady with hypothyroidism on thyroid hormone repletion seen in [facility] today. This is video televisit and thus the examination aspects of the visit are limited    History of Present Illness    The patient, Ms Love is a 57 yr old postmenopausal lady with hypothyroidism presumably due to Hashimoto's disease  Seen in [facility] today via video televisit.  The patient location is: home  The chief complaint leading to consultation is: hypothyroidism on thyroid hormone repletion therapy.    Visit type: Synchronous audio and video televisit    Face to Face time with patient: ~ ??  minutes of total time spent on the encounter, which includes face to face time and non-face to face time preparing to see the patient (eg, review of tests), Obtaining and/or reviewing separately obtained history, Documenting clinical information in the electronic or other health record, Independently interpreting results (not separately reported) and communicating results to the patient/family/caregiver, or Care coordination (not separately reported).         Each patient to whom he or she provides medical services by telemedicine is:  (1) informed of the relationship between the physician and patient and the respective role of any other health care provider with respect to management of the patient; and (2) notified that he or she may decline to receive medical services by telemedicine and may withdraw from such care at any time.    Notes:     She is presently on LT4 125mcg Qd. Her screening thyroid USS from 05/18 showed no thyroid nodules but latest thyroid USs from 12/21 showed lone sub cm nodule. Her next [facility] study should be for ~ 12/22.    Her associated comorbidities are as detailed below;        Patient Active Problem List   Diagnosis    Hypothyroidism due to Hashimoto's thyroiditis     Essential hypertension    Obesity (BMI 30-39.9)    History of low potassium    B12 deficiency    History of vitamin D deficiency    Keratosis pilaris    Xerosis of skin    Polyarthralgia    Fibromyalgia    Adjustment disorder with mixed anxiety and depressed mood    Hyperlipidemia    Difficulty sleeping    Hashimoto's disease    Thyroiditis    Goiter    Abnormal thyroid blood test    Dysmetabolic syndrome    Hypercholesterolemia    Hypovitaminosis D    Postmenopausal      Patients baseline epworth score is 11. She has never had a prior sleep study.  Patient does have remote reports of snoring but none of apneic or gasping spells. She has no major problems with headaches. She has no major mid day somnolence.   She does have chronic fatigue and fibromyalgia. Patient also has concerns regarding progressive weight gain. She gained ~ 9 lbs over the last 2 years. Patients see Dr Weathers for fibromyalgia and non specific polyarthralgia and is on Gabapentin for this.  Patients father has MCTD and lupus.  Patients father has hyperthyroidism and her sister also has variable thyroid function problems.  Patient is menopausal in ~ 1 yr.  Patient has been known to be hypothyroid since 2006.   Patient stopped smoking in 2004.   Grav 5 Para 4+1 ( 4 alive).  Patients screening thyroid USs from 05/18 was essentially unremarkable with no nodules seen.  She does have two cousins with thyroid cancer.   Her screening DEXA from 09/22/20  showed stable osteopenia. Next FFup DEXA should be ~ 09/22.  Patient fell in 01/2020 and fell with left arm fracture.  Patient has been on of LT4 since November 2018. Patient has apparently stopped taking all her medications since November except her iron medications. Patient has apparently been taking her fathers prednisone (her father has prednisone which he takes for mixed connective tissue disease and Lupus). She is presently on prednisone 5mg QD as a self medication.  She does not  have a rheumatologist presently. She feels that the gabapentin she has been prescribed previously for fibromyalgias had not helped at all and instead she feels much better on prednisone.  Patient has not been seen for some time and has been struggling with her father health.  She has been of synthroid for several months and has been of virtually all her other medications.  She has no fresh complaints today.        Review of Systems   Constitutional: Positive for fatigue (chronic but stable). Negative for diaphoresis and unexpected weight change.   HENT: Negative for facial swelling, hearing loss, trouble swallowing and voice change.    Eyes: Negative for photophobia and visual disturbance.   Respiratory: Negative for cough and shortness of breath.    Cardiovascular: Negative for chest pain, palpitations and leg swelling.   Gastrointestinal: Negative for abdominal distention, abdominal pain, nausea and vomiting.   Genitourinary: Negative for menstrual problem (postmenopausal).   Musculoskeletal: Positive for back pain (chronic). Negative for arthralgias and gait problem.   Skin: Negative for color change, pallor and rash.   Neurological: Negative for numbness and headaches.   Hematological: Does not bruise/bleed easily.   Psychiatric/Behavioral: Negative for confusion and sleep disturbance. The patient is not nervous/anxious and is not hyperactive.        Objective: LMP 05/18/2017 (Exact Date)  Nil; video televisit. She did not log in for the scheduled visit.       Physical Exam    Lab Review:     Results for DEXTER OLSEN (MRN [id number]) as of 11/27/2021 07:36   Ref. Range 4/21/2021 07:20 4/21/2021 07:20   WBC Latest Ref Range: 3.90 - 12.70 K/uL 7.22    RBC Latest Ref Range: 4.00 - 5.40 M/uL 4.74    Hemoglobin Latest Ref Range: 12.0 - 16.0 g/dL 13.4    Hematocrit Latest Ref Range: 37.0 - 48.5 % 40.7    MCV Latest Ref Range: 82 - 98 fL 86    MCH Latest Ref Range: 27.0 - 31.0 pg 28.3    MCHC Latest Ref Range:  32.0 - 36.0 g/dL 32.9    RDW Latest Ref Range: 11.5 - 14.5 % 13.0    Platelets Latest Ref Range: 150 - 450 K/uL 186    MPV Latest Ref Range: 9.2 - 12.9 fL 9.9    Gran % Latest Ref Range: 38.0 - 73.0 % 80.3 (H)    Lymph % Latest Ref Range: 18.0 - 48.0 % 12.9 (L)    Mono % Latest Ref Range: 4.0 - 15.0 % 5.1    Eosinophil % Latest Ref Range: 0.0 - 8.0 % 0.4    Basophil % Latest Ref Range: 0.0 - 1.9 % 0.7    Immature Granulocytes Latest Ref Range: 0.0 - 0.5 % 0.6 (H)    Gran # (ANC) Latest Ref Range: 1.8 - 7.7 K/uL 5.8    Lymph # Latest Ref Range: 1.0 - 4.8 K/uL 0.9 (L)    Mono # Latest Ref Range: 0.3 - 1.0 K/uL 0.4    Eos # Latest Ref Range: 0.0 - 0.5 K/uL 0.0    Baso # Latest Ref Range: 0.00 - 0.20 K/uL 0.05    Immature Grans (Abs) Latest Ref Range: 0.00 - 0.04 K/uL 0.04    nRBC Latest Ref Range: 0 /100 WBC 0    Differential Method Unknown Automated    Folate Latest Ref Range: 4.0 - 24.0 ng/mL 4.9    Vitamin B-12 Latest Ref Range: 180 - 914 ng/L 173 (L) 151 (L)   RBC Folate Latest Ref Range: 280 - 791 ng/mL 662    B12 Def. Methylmalonic Acid Latest Ref Range: <=0.40 nmol/mL 0.13    TSH Latest Ref Range: 0.400 - 4.000 uIU/mL 0.196 (L)    T3, Total Latest Ref Range: 60 - 180 ng/dL 101    Free T4 Latest Ref Range: 0.71 - 1.51 ng/dL 1.17    Thyroglobulin Interpretation Unknown SEE BELOW    Thyroglobulin Antibody Screen Latest Ref Range: <1.8 IU/mL >2000 (H)    Thyroglobulin, Tumor Marker Latest Units: ng/mL 3.1 (H)        Assessment:     1. Hypothyroidism (acquired)     2. Nodular thyroid disease     3. Prediabetes     4. Thyroiditis     5. Atrophic gastritis without hemorrhage     6. Osteopenia of multiple sites     7. Dysmetabolic syndrome     8. B12 deficiency     9. Pernicious anemia     10. Nutritional anemia     11. Hyperlipidemia, unspecified hyperlipidemia type     12. Essential hypertension     13. Adjustment disorder with mixed anxiety and depressed mood     14. Hypercholesterolemia     15. Postmenopausal      16. Obesity (BMI 30-39.9)          Regarding hypothyroidism; to continue Lt4 therapy as before and will obtain full TFTs and antibody profile as detailed above.  To continue synthroid 125 mcg QD 6 days week and half a tablet on sundays.  Regarding thyroid nodular disease; Patient has fears of throid cancer in view of +ve family history of same. For ffup thyroid USS for 12/22.  Regarding hyperlipidemia;  Will check lipid levels today and decide based on results regarding potential need for antilipidemic therapy.  Regarding depression; To continue SSRI therapy as before.  Regarding chronic fatigue and fibromyalgia;She is yet to established a relationship with a rheumatologist she is comfortable with. . For now to continue low dose prednisone 5mg Qd as before which she has been using for symptom control. Advised regarding the potential risks of long term chronic steroid use.  Regarding hypovitaminosis D; to check 25OH vit d levels and based on results may need to adjust current repletion dose.  Regarding hypertension; Presently suboptimally controlled.  To continue present antihypertensive regimen and continue serial tracking of ambulatory BP trends. To send results of BP and pulse rate ambulatory tracking after 1-2 weeks and if elevation persists will need medication dose adjustment. She is presently just on Lotensin 10-12.5mg  Regarding Obesity and dystmetabolic syndrome; to obtain basic screening labs as detailed above. Her recent weight gain is multifactorial and this was discussed in face to face counselling of patient; tobacco cessation, recent post menopausal transition, high dose neurontin and Zoloft use are all contributors. Will discuss management strategy in more detail @ ffup visit.  Regarding vit B12 deficiency; Latest B12 levels are low; to restart monthly Vitamin B12 injections 1 g monthly  Regarding osteopenia; to ensure restart of daily calcium and vitamin D supplementation.v for ffup DEXA  09/22  Regarding Polyarthralgic syndrome; to refer to Dr Fine for ongoing management.  Regarding autoimmune gastritis and pernicious anemia; to start prilosec 20mg QHs and advised to significantly drop her beverage based caffeine intake.    Plan:     The patient could not be reached. She did not log in for her scheduled appt and attempts to reach her @ her listed number got a voice mail box that indicated voice mail cache being full and so I was unable to leave a message. She will need to reschedule her appt but can get the ordered lab tests prior to the rescheduled visit.

## 2022-08-24 LAB
THRYOGLOBULIN INTERPRETATION: ABNORMAL
THRYOGLOBULIN INTERPRETATION: ABNORMAL
THYROGLOB AB SERPL-ACNC: 1707 IU/ML
THYROGLOB AB SERPL-ACNC: 1915 IU/ML
THYROGLOB SERPL-MCNC: 3.1 NG/ML
THYROGLOB SERPL-MCNC: 5 NG/ML

## 2022-10-13 NOTE — TELEPHONE ENCOUNTER
Patient advised her appointment for Dr Souza was cancelled as she is leaving [facility]. She would like to have another recommendation for Rheumatology and also since Dr Alcaraz had given enough PredniSONE to last until she saw Dr Souza. She will be running out and would like to know if she can get a refill until she can be seen by Rheumatology    No

## 2022-12-21 ENCOUNTER — HOSPITAL ENCOUNTER (OUTPATIENT)
Dept: RADIOLOGY | Facility: CLINIC | Age: 57
Discharge: HOME | End: 2022-12-21
Attending: INTERNAL MEDICINE
Payer: COMMERCIAL

## 2022-12-21 DIAGNOSIS — M85.89 OSTEOPENIA OF MULTIPLE SITES: ICD-10-CM

## 2022-12-21 PROCEDURE — 77080 DXA BONE DENSITY AXIAL: CPT | Mod: TC,PO

## 2022-12-21 PROCEDURE — 77080 DEXA BONE DENSITY SPINE HIP: ICD-10-PCS | Mod: 26,,, | Performed by: INTERNAL MEDICINE

## 2022-12-21 PROCEDURE — 77080 DXA BONE DENSITY AXIAL: CPT | Mod: 26,,, | Performed by: INTERNAL MEDICINE

## 2023-05-09 ENCOUNTER — TELEPHONE (OUTPATIENT)
Dept: ENDOCRINOLOGY | Facility: CLINIC | Age: 58
End: 2023-05-09
Payer: COMMERCIAL

## 2023-05-09 NOTE — TELEPHONE ENCOUNTER
Called patient to reschedule 5/26/23 appointment with Ms. Lara, she will be out of the office that day. Mailbox was full.

## 2024-11-15 NOTE — TELEPHONE ENCOUNTER
----- Message from Mila Raman sent at 12/16/2019  4:57 PM CST -----  Type:  Pharmacy Calling to Clarify an RX    Name of Caller: Annabel  Pharmacy Name:  [facility]  Prescription Name:  gabapentin (NEURONTIN) 300 MG capsule  What do they need to clarify?:  Directions are contradictory   Best Call Back Number:  [phone]  Additional Information:  Thank you!   CBC reveals normal hemoglobin, normal platelet count, normal white blood cell count.  TSH is normal.  Cholesterol is very good.  LDL is 84.  CMP reveals normal kidney function and normal electrolytes.  AST is mildly elevated at 49.  Will repeat CMP in 3 months.  Hemoglobin A1c is 5.9.  This is considered prediabetic but stable.  Continue current medications.